# Patient Record
Sex: MALE | Race: WHITE | NOT HISPANIC OR LATINO | Employment: FULL TIME | URBAN - METROPOLITAN AREA
[De-identification: names, ages, dates, MRNs, and addresses within clinical notes are randomized per-mention and may not be internally consistent; named-entity substitution may affect disease eponyms.]

---

## 2017-07-10 ENCOUNTER — OFFICE VISIT (OUTPATIENT)
Dept: FAMILY MEDICINE CLINIC | Facility: CLINIC | Age: 42
End: 2017-07-10

## 2017-07-10 VITALS
HEIGHT: 68 IN | SYSTOLIC BLOOD PRESSURE: 120 MMHG | TEMPERATURE: 98.8 F | DIASTOLIC BLOOD PRESSURE: 70 MMHG | OXYGEN SATURATION: 98 % | WEIGHT: 250 LBS | RESPIRATION RATE: 16 BRPM | HEART RATE: 83 BPM | BODY MASS INDEX: 37.89 KG/M2

## 2017-07-10 DIAGNOSIS — R07.2 SUBSTERNAL CHEST PAIN: Primary | ICD-10-CM

## 2017-07-10 DIAGNOSIS — R07.1 INSPIRATORY PAIN: ICD-10-CM

## 2017-07-10 PROCEDURE — 93000 ELECTROCARDIOGRAM COMPLETE: CPT | Performed by: NURSE PRACTITIONER

## 2017-07-10 PROCEDURE — 71020 XR CHEST 2 VW: CPT | Performed by: NURSE PRACTITIONER

## 2017-07-10 PROCEDURE — 99213 OFFICE O/P EST LOW 20 MIN: CPT | Performed by: NURSE PRACTITIONER

## 2017-07-10 NOTE — PROGRESS NOTES
"Isabela Pisano is a 42 y.o. male.     History of Present Illness   C[place]o sternum pain, [place] when he got in the car, he states it is getting better, but had pain all night and into this morning. He states he thinks \"water went down the wrong way.\" hurts to breath in deep, hurt getting in and out of bed, he states he has had a broken rib before and feels like this, he states the pain is behind the sternum, he states he feels better now but hurts when he takes a deep breath, he [place] wake [place], denies SOA,CP, he denies any heartburn hx, denies HA, change in vision, LE edema. Denies any tenderness to touch. He tried tylenol yesterday but did not help. He denies any injury in particular. He denies cough. He saw Dr. Farias about 2 years ago for bradycardia, wore holter but normal. Normal ekg from 2 years ago, no fam hx of heart problems.     The following portions of the patient's history were reviewed and updated as appropriate: allergies, current medications, past family history, past medical history, past social history, past surgical history and problem list.    Review of Systems   Constitutional: Negative for chills, diaphoresis and fever.   HENT: Negative for congestion, ear pain, postnasal drip, rhinorrhea and sore throat.    Respiratory: Positive for chest tightness. Negative for apnea, cough, shortness of breath and wheezing.    Cardiovascular: Positive for chest pain. Negative for palpitations and leg swelling.   Gastrointestinal: Negative for abdominal pain.   Musculoskeletal: Negative for arthralgias and myalgias.   Skin: Negative for pallor.   Neurological: Negative for dizziness, light-headedness and headaches.   All other systems reviewed and are negative.      Objective   Physical Exam   Constitutional: He is oriented to person, place, and time. He appears well-developed and well-nourished.   HENT:   Head: Normocephalic.   Eyes: Pupils " are equal, round, and reactive to light.   Neck: Neck supple.   Cardiovascular: Normal rate, regular rhythm and normal heart sounds.    Pulmonary/Chest: Effort normal and breath sounds normal. No respiratory distress. He has no decreased breath sounds. He has no wheezes. He has no rhonchi. He exhibits no tenderness and no bony tenderness.   Musculoskeletal: Normal range of motion.   Lymphadenopathy:     He has no cervical adenopathy.   Neurological: He is alert and oriented to person, place, and time.   Skin: Skin is warm and dry.   Psychiatric: He has a normal mood and affect. His behavior is normal. Judgment and thought content normal.   Nursing note and vitals reviewed.  cxr 2v and rib xray in office for pain on inspiration and sternal pain, no comparison noted, shows NAD, awaiting radiology over read.     Assessment/Plan   Jalen was seen today for breathing problem.    Diagnoses and all orders for this visit:    Sternum pain  -     XR Chest 2 View  -     XR Ribs Bilateral 3 View (In Office)    Inspiratory pain  -     XR Chest 2 View  -     XR Ribs Bilateral 3 View (In Office)        cxr 2v in office, rib xray today, will call with results.  EKG in office today, within normal limits.   Stress echo, will call with appt.   If any CP,SOA, or worsening symptoms patient advised to go to the nearest ER.  May try tylenol as needed for pain.   Discussed plan of care with Dr. Ayala.   Increase fluid intake, get plenty of rest.   Patient agrees with plan of care and understands instructions. Call if worsening symptoms or any problems or concerns.

## 2017-07-10 NOTE — PROGRESS NOTES
Procedure     ECG 12 Lead  Date/Time: 7/10/2017 4:26 PM  Performed by: MAUREEN SANCHES  Authorized by: MAUREEN SANCHES   Comparison: compared with previous ECG from 10/2/2013  Comparison to previous ECG: Sinus bradycardia  Rhythm: sinus rhythm  Rate: normal  QRS axis: normal  Clinical impression: normal ECG

## 2017-07-10 NOTE — PATIENT INSTRUCTIONS
cxr 2v in office, rib xray today, will call with results.  EKG in office today, normal.   Stress echo, will call with appt.   If any CP,SOA, or worsening symptoms patient advised to go to the nearest ER.  Discussed plan of care with Dr. Ayala.   Increase fluid intake, get plenty of rest.   Patient agrees with plan of care and understands instructions. Call if worsening symptoms or any problems or concerns.

## 2017-07-12 ENCOUNTER — TELEPHONE (OUTPATIENT)
Dept: FAMILY MEDICINE CLINIC | Facility: CLINIC | Age: 42
End: 2017-07-12

## 2017-07-12 NOTE — TELEPHONE ENCOUNTER
----- Message from RASHAD Archer sent at 7/12/2017  3:00 PM EDT -----  Please call patient with results.  cxr normal.    Pt informed of xray results

## 2017-07-19 ENCOUNTER — TELEPHONE (OUTPATIENT)
Dept: FAMILY MEDICINE CLINIC | Facility: CLINIC | Age: 42
End: 2017-07-19

## 2017-07-31 ENCOUNTER — TELEPHONE (OUTPATIENT)
Dept: FAMILY MEDICINE CLINIC | Facility: CLINIC | Age: 42
End: 2017-07-31

## 2017-07-31 NOTE — TELEPHONE ENCOUNTER
LMOM asked pt why he wanted to cancel Echo test and asked him to call back so the test can be cancelled.

## 2017-11-02 ENCOUNTER — OFFICE VISIT (OUTPATIENT)
Dept: ORTHOPEDIC SURGERY | Facility: CLINIC | Age: 42
End: 2017-11-02

## 2017-11-02 VITALS
WEIGHT: 250 LBS | SYSTOLIC BLOOD PRESSURE: 135 MMHG | DIASTOLIC BLOOD PRESSURE: 86 MMHG | HEART RATE: 62 BPM | HEIGHT: 68 IN | BODY MASS INDEX: 37.89 KG/M2

## 2017-11-02 DIAGNOSIS — M75.41 SUBACROMIAL IMPINGEMENT OF RIGHT SHOULDER: ICD-10-CM

## 2017-11-02 DIAGNOSIS — R52 PAIN: Primary | ICD-10-CM

## 2017-11-02 DIAGNOSIS — G89.29 CHRONIC RIGHT SHOULDER PAIN: ICD-10-CM

## 2017-11-02 DIAGNOSIS — M75.81 TENDINITIS OF RIGHT ROTATOR CUFF: ICD-10-CM

## 2017-11-02 DIAGNOSIS — M75.21 BICEPS TENDINITIS OF RIGHT UPPER EXTREMITY: ICD-10-CM

## 2017-11-02 DIAGNOSIS — M25.511 CHRONIC RIGHT SHOULDER PAIN: ICD-10-CM

## 2017-11-02 PROCEDURE — 99213 OFFICE O/P EST LOW 20 MIN: CPT | Performed by: ORTHOPAEDIC SURGERY

## 2017-11-02 PROCEDURE — 73030 X-RAY EXAM OF SHOULDER: CPT | Performed by: ORTHOPAEDIC SURGERY

## 2017-11-02 NOTE — PROGRESS NOTES
Subjective:     Patient ID: Jalen Pisano is a 42 y.o. male.    Chief Complaint:  Follow-up right shoulder pain  History of Present Illness  Jalen Pisano returns to clinic today for evaluation of right shoulder, icing patient proximally 2 years ago and had some crepitus primarily the subacromial space is causing problems for him when he is doing any throwing activities, it was a fairly tolerable level that point time he treated it conservatively.  However the pain is continued to wax and wane but is been more inflamed over the past 3-4 months, particularly exacerbated when he was driving a car for 40 hours to and from [place], since the time is localized increased levels of pain to the anterolateral aspect of his right shoulder with increased crepitus.  Rates current level pain as a 7-8 out of 10, aching in nature, worse at night, exacerbated with overhead and crossarm and internal rotation activities, minimal improvement with rest and over-the-counter anti-inflammatory medications as well as home program.  Denies any associated numbness or tingling right upper extremity, denies radiation from his shoulder.  Does note a sense of catching the shoulder as well particularly with any attempts at overhead throwing.     Social History     Occupational History   • Not on file.     Social History Main Topics   • Smoking status: Never Smoker   • Smokeless tobacco: Never Used   • Alcohol use Yes      Comment: occas   • Drug use: No   • Sexual activity: Not on file      History reviewed. No pertinent past medical history.  Past Surgical History:   Procedure Laterality Date   • KNEE MENISCECTOMY Left 1996       Family History   Problem Relation Age of Onset   • Diabetes Father    • Hypertension Father          Review of Systems   Constitutional: Negative for chills, diaphoresis, fever and unexpected weight change.   HENT: Negative for hearing loss, nosebleeds, sore throat and tinnitus.    Eyes: Negative for pain and visual  "disturbance.   Respiratory: Negative for cough, shortness of breath and wheezing.    Cardiovascular: Negative for chest pain and palpitations.   Gastrointestinal: Negative for abdominal pain, diarrhea, nausea and vomiting.   Endocrine: Negative for cold intolerance, heat intolerance and polydipsia.   Genitourinary: Negative for difficulty urinating, dysuria and hematuria.   Musculoskeletal: Positive for arthralgias. Negative for joint swelling and myalgias.   Skin: Negative for rash and wound.   Allergic/Immunologic: Negative for environmental allergies.   Neurological: Negative for dizziness, syncope and numbness.   Hematological: Does not bruise/bleed easily.   Psychiatric/Behavioral: Negative for dysphoric mood and sleep disturbance. The patient is not nervous/anxious.            Objective:  Vitals:    11/02/17 1037   BP: 135/86   Pulse: 62   Weight: 250 lb (113 kg)   Height: 68\" (172.7 cm)     Last 2 weights    11/02/17  1037   Weight: 250 lb (113 kg)     Body mass index is 38.01 kg/(m^2).  General: No acute distress.  Resp: normal respiratory effort  Skin: no rashes or wounds; normal turgor  Psych: mood and affect appropriate; recent and remote memory intact         Ortho Exam    Right shoulder-active forward flexion 165°, active external rotation 45°, active internal rotation L1, 4 out of 5 strength on resisted forward flexion and external rotation, 4+ out of 5 strength on belly press test with negative bear hug sign.  Minimally positive Yergason, speed's, [place]'s.  Mild tenderness over acromioclavicular joint with mildly positive crossarm test.  Positive Villa and Neer's, positive empty can test, negative drop arm test, negative external rotation lag sign.  Brisk cap refill all digits right upper extremity, 2+ radial pulse right wrist.    Imaging:  Right Shoulder X-Ray  Indication: Pain  AP, scapular Y, and axillary lateral views    Findings:  No fracture  No bony lesion  Normal soft tissues  Normal joint " spaces, small region of osteophyte formation along inferior glenoid with no significant glenohumeral joint space narrowing.    No prior studies were available for comparison.    Assessment:       1. Pain    2. Chronic right shoulder pain    3. Tendinitis of right rotator cuff    4. Biceps tendinitis of right upper extremity    5. Subacromial impingement of right shoulder          Plan:  BRIAN query complete.          Discussed treatment options at length with patient at today's visit. Given persistence of symptoms over last several years with acute exacerbation as well as mechanical nature of symptoms and findings on physical exam today as well as failure conservative treatment, patient would like to proceed with MR arthrogram right shoulder to evaluate for labral pathology as well as rotator cuff tear.  I will contact patient with results from MRI and discuss further treatment options at that time.    Jalen Pisano was in agreement with plan and had all questions answered.     Orders:  Orders Placed This Encounter   Procedures   • XR Shoulder 2+ View Right   • FL Contrast Injection CT / MRI   • MRI shoulder right arthrogram       Medications:  No orders of the defined types were placed in this encounter.      Followup:  No Follow-up on file.    Jalen was seen today for pain.    Diagnoses and all orders for this visit:    Pain  -     XR Shoulder 2+ View Right    Chronic right shoulder pain  -     FL Contrast Injection CT / MRI; Future  -     MRI shoulder right arthrogram; Future    Tendinitis of right rotator cuff    Biceps tendinitis of right upper extremity    Subacromial impingement of right shoulder        Dragon transcription disclaimer     Much of this encounter note is an electronic transcription/translation of spoken language to printed text. The electronic translation of spoken language may permit erroneous, or at times, nonsensical words or phrases to be inadvertently transcribed. Although I have reviewed  the note for such errors, some may still exist.

## 2017-11-09 ENCOUNTER — TELEPHONE (OUTPATIENT)
Dept: ORTHOPEDIC SURGERY | Facility: CLINIC | Age: 42
End: 2017-11-09

## 2017-11-09 DIAGNOSIS — M25.511 CHRONIC RIGHT SHOULDER PAIN: ICD-10-CM

## 2017-11-09 DIAGNOSIS — G89.29 CHRONIC RIGHT SHOULDER PAIN: ICD-10-CM

## 2017-11-14 ENCOUNTER — OFFICE VISIT (OUTPATIENT)
Dept: ORTHOPEDIC SURGERY | Facility: CLINIC | Age: 42
End: 2017-11-14

## 2017-11-14 DIAGNOSIS — M19.019 AC JOINT ARTHROPATHY: ICD-10-CM

## 2017-11-14 DIAGNOSIS — M75.41 SUBACROMIAL IMPINGEMENT OF RIGHT SHOULDER: ICD-10-CM

## 2017-11-14 DIAGNOSIS — R52 PAIN: Primary | ICD-10-CM

## 2017-11-14 DIAGNOSIS — M75.81 TENDINITIS OF RIGHT ROTATOR CUFF: ICD-10-CM

## 2017-11-14 PROCEDURE — 99214 OFFICE O/P EST MOD 30 MIN: CPT | Performed by: ORTHOPAEDIC SURGERY

## 2017-11-14 PROCEDURE — 20605 DRAIN/INJ JOINT/BURSA W/O US: CPT | Performed by: ORTHOPAEDIC SURGERY

## 2017-11-14 RX ORDER — BUPIVACAINE HYDROCHLORIDE 5 MG/ML
1 INJECTION, SOLUTION EPIDURAL; INTRACAUDAL
Status: COMPLETED | OUTPATIENT
Start: 2017-11-14 | End: 2017-11-14

## 2017-11-14 RX ORDER — TRIAMCINOLONE ACETONIDE 40 MG/ML
40 INJECTION, SUSPENSION INTRA-ARTICULAR; INTRAMUSCULAR
Status: COMPLETED | OUTPATIENT
Start: 2017-11-14 | End: 2017-11-14

## 2017-11-14 RX ORDER — LIDOCAINE HYDROCHLORIDE 10 MG/ML
1 INJECTION, SOLUTION EPIDURAL; INFILTRATION; INTRACAUDAL; PERINEURAL
Status: COMPLETED | OUTPATIENT
Start: 2017-11-14 | End: 2017-11-14

## 2017-11-14 RX ADMIN — BUPIVACAINE HYDROCHLORIDE 1 ML: 5 INJECTION, SOLUTION EPIDURAL; INTRACAUDAL at 13:23

## 2017-11-14 RX ADMIN — LIDOCAINE HYDROCHLORIDE 1 ML: 10 INJECTION, SOLUTION EPIDURAL; INFILTRATION; INTRACAUDAL; PERINEURAL at 13:23

## 2017-11-14 RX ADMIN — TRIAMCINOLONE ACETONIDE 40 MG: 40 INJECTION, SUSPENSION INTRA-ARTICULAR; INTRAMUSCULAR at 13:23

## 2017-11-14 NOTE — PROGRESS NOTES
Subjective:     Patient ID: Jalen Pisano is a 42 y.o. male.    Chief Complaint:  Follow-up right shoulder pain  History of Present Illness  Jalen Pisano returns to clinic today for evaluation of right shoulder, here today for follow-up on MRI, has noted continued pain of the superior aspect with some extension over the anterolateral aspect of his right shoulder, exacerbated primarily at this point time with crossarm and internal rotation activities, continued minimal improvement with rest and anti-inflammatory medications, rates current level of pain as a 7 out of 10 and describes it as aching in nature.  Denies any numbness or tingling right upper extremity, denies any significant radiation of pain down into his arm, does note occasional pain up into the paraspinal region of his right neck.   Social History     Occupational History   • Not on file.     Social History Main Topics   • Smoking status: Never Smoker   • Smokeless tobacco: Never Used   • Alcohol use Yes      Comment: occas   • Drug use: No   • Sexual activity: Not on file      No past medical history on file.  Past Surgical History:   Procedure Laterality Date   • KNEE MENISCECTOMY Left 1996       Family History   Problem Relation Age of Onset   • Diabetes Father    • Hypertension Father          Review of Systems   Constitutional: Negative for chills, diaphoresis, fever and unexpected weight change.   HENT: Negative for hearing loss, nosebleeds, sore throat and tinnitus.    Eyes: Negative for pain and visual disturbance.   Respiratory: Negative for cough, shortness of breath and wheezing.    Cardiovascular: Negative for chest pain and palpitations.   Gastrointestinal: Negative for abdominal pain, diarrhea, nausea and vomiting.   Endocrine: Negative for cold intolerance, heat intolerance and polydipsia.   Genitourinary: Negative for difficulty urinating, dysuria and hematuria.   Musculoskeletal: Positive for arthralgias. Negative for joint swelling and  myalgias.   Skin: Negative for rash and wound.   Allergic/Immunologic: Negative for environmental allergies.   Neurological: Negative for dizziness, syncope and numbness.   Hematological: Does not bruise/bleed easily.   Psychiatric/Behavioral: Negative for dysphoric mood and sleep disturbance. The patient is not nervous/anxious.    All other systems reviewed and are negative.          Objective:  There were no vitals filed for this visit.  There were no vitals filed for this visit.  There is no height or weight on file to calculate BMI.  General: No acute distress.  Resp: normal respiratory effort  Skin: no rashes or wounds; normal turgor  Psych: mood and affect appropriate; recent and remote memory intact         Ortho Exam    Right shoulder-active forward flexion 170°, external rotation 50°, internal rotation L1, 4+ out of 5 strength in all planes of motion, maximal tenderness palpation over acromioclavicular joint with positive crossarm test, mildly positive Villa and Neer's, negative drop arm test, mildly positive empty can test, negative external rotation lag sign.  Negative Yergason, speed's, [place]'s.    Imaging:  Review of outside MRI arthrogram right shoulder as well as radiology report from [facility] and open MRI indicates significant degenerative changes acromial clavicular joint with marrow edema noted in the distal clavicle and proximal acromion adjacent to this area, mild rotator cuff tendinopathy appreciated with no evidence of full-thickness rotator cuff tear.  No discrete evidence of SLAP lesion.    Assessment:       1. Pain    2. AC joint arthropathy    3. Tendinitis of right rotator cuff    4. Subacromial impingement of right shoulder          Plan:  BRIAN query complete.  Medium Joint Arthrocentesis  Date/Time: 11/14/2017 1:23 PM  Procedure Details  Location: shoulder - R acromioclavicular  Needle size: 22 G  Medications administered: 40 mg triamcinolone acetonide 40 MG/ML; 1 mL lidocaine PF  1% 1 %; 1 mL bupivacaine (PF) 0.5 %                Discussed treatment options at length with patient at today's visit.  Given localization of pain primarily over the acromial clavicular joint we discussed options and patient did wish to proceed today with local injection to the acromial clavicular joint.  Recommended ice, soft tissue massage, and avoidance of crossarm activities for 5-7 days to assess for improvement.  He states he does still also has some stiffness, may consider a glenohumeral injection at follow-up visit if incomplete relief with acromioclavicular joint injection.    Jalen Pisano was in agreement with plan and had all questions answered.     Orders:  Orders Placed This Encounter   Procedures   • Medium Joint Arthrocentesis       Medications:  No orders of the defined types were placed in this encounter.      Followup:  Return in about 4 weeks (around 12/12/2017).    Jalen was seen today for follow-up and pain.    Diagnoses and all orders for this visit:    Pain  -     Medium Joint Arthrocentesis    AC joint arthropathy  -     Medium Joint Arthrocentesis    Tendinitis of right rotator cuff  -     Medium Joint Arthrocentesis    Subacromial impingement of right shoulder  -     Medium Joint Arthrocentesis    Other orders  -     Cancel: Large Joint Arthrocentesis        Dragon transcription disclaimer     Much of this encounter note is an electronic transcription/translation of spoken language to printed text. The electronic translation of spoken language may permit erroneous, or at times, nonsensical words or phrases to be inadvertently transcribed. Although I have reviewed the note for such errors, some may still exist.

## 2017-12-14 ENCOUNTER — OFFICE VISIT (OUTPATIENT)
Dept: ORTHOPEDIC SURGERY | Facility: CLINIC | Age: 42
End: 2017-12-14

## 2017-12-14 DIAGNOSIS — M19.019 AC JOINT ARTHROPATHY: ICD-10-CM

## 2017-12-14 DIAGNOSIS — M75.81 TENDINITIS OF RIGHT ROTATOR CUFF: Primary | ICD-10-CM

## 2017-12-14 DIAGNOSIS — M75.41 SUBACROMIAL IMPINGEMENT OF RIGHT SHOULDER: ICD-10-CM

## 2017-12-14 PROCEDURE — 99212 OFFICE O/P EST SF 10 MIN: CPT | Performed by: ORTHOPAEDIC SURGERY

## 2018-02-06 ENCOUNTER — OFFICE VISIT (OUTPATIENT)
Dept: ORTHOPEDIC SURGERY | Facility: CLINIC | Age: 43
End: 2018-02-06

## 2018-02-06 DIAGNOSIS — M19.019 AC JOINT ARTHROPATHY: Primary | ICD-10-CM

## 2018-02-06 DIAGNOSIS — M75.41 SUBACROMIAL IMPINGEMENT OF RIGHT SHOULDER: ICD-10-CM

## 2018-02-06 PROCEDURE — 99214 OFFICE O/P EST MOD 30 MIN: CPT | Performed by: ORTHOPAEDIC SURGERY

## 2018-02-06 RX ORDER — OXYCODONE HCL 10 MG/1
10 TABLET, FILM COATED, EXTENDED RELEASE ORAL ONCE
Status: CANCELLED | OUTPATIENT
Start: 2018-02-06 | End: 2018-02-06

## 2018-02-06 RX ORDER — ACETAMINOPHEN 325 MG/1
1000 TABLET ORAL ONCE
Status: CANCELLED | OUTPATIENT
Start: 2018-02-06 | End: 2018-02-06

## 2018-02-06 RX ORDER — PANTOPRAZOLE SODIUM 20 MG/1
40 TABLET, DELAYED RELEASE ORAL ONCE
Status: CANCELLED | OUTPATIENT
Start: 2018-02-06 | End: 2018-02-06

## 2018-02-06 RX ORDER — MELOXICAM 7.5 MG/1
15 TABLET ORAL ONCE
Status: CANCELLED | OUTPATIENT
Start: 2018-02-06 | End: 2018-02-06

## 2018-02-06 RX ORDER — PREGABALIN 25 MG/1
75 CAPSULE ORAL ONCE
Status: CANCELLED | OUTPATIENT
Start: 2018-02-06 | End: 2018-02-06

## 2018-02-06 NOTE — PROGRESS NOTES
Subjective:     Patient ID: Jalen Pisano is a 42 y.o. male.    Chief Complaint:  Follow-up right shoulder pain, acromioclavicular joint arthropathy, subacromial impingement and rotator cuff tendinopathy  History of Present Illness  Jalen Pisano returns to clinic today for evaluation of right shoulder, over the last 4 weeks he's been having increasing levels of pain that is been exacerbated ×2 weeks to a level of 7-8 out of 10, aching in nature, exacerbated primarily with crossarm activities, minimal improvement with rest and anti--4 medications.  He did have good short-term relief with prior acromial clavicular joint injection.  Localizes pain to the superior aspect the shoulder, denies radiation, denies associated numbness or tingling.     Social History     Occupational History   • Not on file.     Social History Main Topics   • Smoking status: Never Smoker   • Smokeless tobacco: Never Used   • Alcohol use Yes      Comment: occas   • Drug use: No   • Sexual activity: Not on file      No past medical history on file.  Past Surgical History:   Procedure Laterality Date   • KNEE MENISCECTOMY Left 1996       Family History   Problem Relation Age of Onset   • Diabetes Father    • Hypertension Father          Review of Systems   Constitutional: Negative for chills, diaphoresis, fever and unexpected weight change.   HENT: Negative for hearing loss, nosebleeds, sore throat and tinnitus.    Eyes: Negative for pain and visual disturbance.   Respiratory: Negative for cough, shortness of breath and wheezing.    Cardiovascular: Negative for chest pain and palpitations.   Gastrointestinal: Negative for abdominal pain, diarrhea, nausea and vomiting.   Endocrine: Negative for cold intolerance, heat intolerance and polydipsia.   Genitourinary: Negative for difficulty urinating, dysuria and hematuria.   Musculoskeletal: Positive for arthralgias.   Skin: Negative for rash and wound.   Allergic/Immunologic: Negative for environmental  allergies.   Neurological: Negative for dizziness, syncope and numbness.   Hematological: Does not bruise/bleed easily.   Psychiatric/Behavioral: Negative for dysphoric mood and sleep disturbance. The patient is not nervous/anxious.    All other systems reviewed and are negative.          Objective:  There were no vitals filed for this visit.  There were no vitals filed for this visit.  There is no height or weight on file to calculate BMI.  General: No acute distress.  Resp: normal respiratory effort  Skin: no rashes or wounds; normal turgor  Psych: mood and affect appropriate; recent and remote memory intact         Ortho Exam    Right shoulder-maximal tenderness palpation over acromial clavicular joint with positive crossarm test, active forward flexion 175°, external rotation 55°, internal rotation T12, 4+ out of 5 strength in all planes of motion with minimally positive empty can test, mildly positive Villa and Neer's, negative drop arm test, negative external rotation lag sign, negative speed, negative Yergason, negative [place]'s.  Positive sensation light touch all discretions right hand symmetric to the left, brisk cap refill all digits, 2+ radial pulse right wrist.    Imaging:  Review again of prior MRI right shoulder as well as radiology report indicates mild rotator cuff tendinopathy, moderate acromial clavicular joint arthropathy with bony edema in the proximal acromion and distal clavicle surrounding the region.  No evidence of full-thickness rotator cuff tear.    Assessment:       1. AC joint arthropathy    2. Subacromial impingement of right shoulder          Plan:  BRIAN query complete.          Discussed treatment options at length with patient at today's visit. At this point in time given failure of injections, anti-inflammatory medications, home exercise program, and activity modification, patient will like to proceed with surgery. Plan will be for right shoulder arthroscopy, possible rotator  cuff debridement versus repair, possible biceps tenodesis, subacromial decompression, open distal clavicle excision and all associated procedures.  I reviewed risks benefits and alternatives the procedure with risks including not limited to neurovascular damage, bleeding, infection, chronic pain, re-tear rotator cuff, failure of healing rotator cuff, loss of motion, weakness, stiffness, instability, biceps sag, DVT, pulmonary embolus, death, stroke, complex regional pain syndrome, myocardial infarction, need for additional procedures. He understood all these had all questions answered.  Patient verbally consented to proceed with surgery.  No guarantees were given regarding results of surgery.  We will have patient medically optimized by primary care physician and proceed with surgery at next available date.    Patient denies any significant cardiac history.  Denies history of DVT or pulmonary embolus.    Jalen Pisano was in agreement with plan and had all questions answered.     Orders:  Orders Placed This Encounter   Procedures   • Basic metabolic panel   • Protime-INR   • APTT   • Follow anesthesia standing orders.   • Provide instructions to patient regarding NPO status   • Clorhexidine skin prep   • CBC and Differential       Medications:  No orders of the defined types were placed in this encounter.      Followup:  No Follow-up on file.    Jalen was seen today for follow-up and pain.    Diagnoses and all orders for this visit:    AC joint arthropathy  -     Case Request; Standing  -     CBC and Differential; Future  -     Basic metabolic panel; Future  -     Protime-INR; Future  -     APTT; Future  -     pregabalin (LYRICA) capsule 75 mg; Take 3 capsules by mouth 1 (One) Time.  -     oxyCODONE (oxyCONTIN) 12 hr tablet 10 mg; Take 1 tablet by mouth 1 (One) Time.  -     acetaminophen (TYLENOL) tablet 975 mg; Take 3 tablets by mouth 1 (One) Time.  -     meloxicam (MOBIC) tablet 15 mg; Take 2 tablets by mouth 1 (One)  Time.  -     pantoprazole (PROTONIX) EC tablet 40 mg; Take 2 tablets by mouth 1 (One) Time.  -     Case Request    Subacromial impingement of right shoulder    Other orders  -     Follow anesthesia standing orders.  -     Provide instructions to patient regarding NPO status  -     Clorhexidine skin prep  -     Follow Anesthesia Guidelines / Standing Orders; Standing  -     Verify NPO Status; Standing  -     Clip operative site; Standing  -     Obtain informed consent (if not collected inpatient or PAT); Standing        Dictated utilizing Dragon dictation

## 2018-02-07 ENCOUNTER — PREP FOR SURGERY (OUTPATIENT)
Dept: OTHER | Facility: HOSPITAL | Age: 43
End: 2018-02-07

## 2018-02-07 DIAGNOSIS — M19.019 AC JOINT ARTHROPATHY: Primary | ICD-10-CM

## 2018-02-15 ENCOUNTER — OFFICE VISIT (OUTPATIENT)
Dept: FAMILY MEDICINE CLINIC | Facility: CLINIC | Age: 43
End: 2018-02-15

## 2018-02-15 VITALS
TEMPERATURE: 97.9 F | WEIGHT: 253 LBS | OXYGEN SATURATION: 92 % | HEIGHT: 68 IN | SYSTOLIC BLOOD PRESSURE: 138 MMHG | HEART RATE: 73 BPM | DIASTOLIC BLOOD PRESSURE: 88 MMHG | BODY MASS INDEX: 38.34 KG/M2

## 2018-02-15 DIAGNOSIS — Z01.818 PREOPERATIVE CLEARANCE: Primary | ICD-10-CM

## 2018-02-15 PROCEDURE — 99396 PREV VISIT EST AGE 40-64: CPT | Performed by: FAMILY MEDICINE

## 2018-02-15 NOTE — PROGRESS NOTES
SUBJECTIVE:  The patient is a 42-year-old white male who comes in for preoperative clearance.  He is going have right shoulder surgery on February 26.  He is relatively healthy. He Takes no regular medications.  He has no cardiac history.  He feels fine today other than his shoulder problems.     PAST MEDICAL HISTORY:  Reviewed.    REVIEW OF SYSTEMS:  Please see above; 14 point ROS otherwise negative.      OBJECTIVE: Vitals signs are reviewed and are stable.    HEENT: PERRLA.    Neck:  Supple.    Lungs:  Clear.  Equal bilateral expansion  Heart:  Regular rate and rhythm.  Murmur gallop or rub  Abdomen:   Soft, nontender.    Extremities:  No cyanosis, clubbing or edema.      ASSESSMENT:     Preoperative clearance.  Obesity    PLAN:  Once his preoperative labs are reviewed and I will approve him for surgery.  He will for the this information to me.    Much of this encounter note is an electronic transcription/translation of spoken language to printed text.  The electronic translation of spoken language may permit erroneous, or at times, nonsensical words or phrases to be inadvertently transcribed.  Although I have reviewed the note for such errors, some may still exist.

## 2018-02-16 ENCOUNTER — APPOINTMENT (OUTPATIENT)
Dept: PREADMISSION TESTING | Facility: HOSPITAL | Age: 43
End: 2018-02-16

## 2018-02-16 VITALS
HEIGHT: 68 IN | OXYGEN SATURATION: 96 % | BODY MASS INDEX: 38.43 KG/M2 | DIASTOLIC BLOOD PRESSURE: 80 MMHG | RESPIRATION RATE: 16 BRPM | SYSTOLIC BLOOD PRESSURE: 142 MMHG | WEIGHT: 253.6 LBS | HEART RATE: 56 BPM

## 2018-02-16 DIAGNOSIS — M19.019 AC JOINT ARTHROPATHY: ICD-10-CM

## 2018-02-16 LAB
ANION GAP SERPL CALCULATED.3IONS-SCNC: 12.9 MMOL/L
APTT PPP: 28.6 SECONDS (ref 24.3–38.1)
BASOPHILS # BLD AUTO: 0.04 10*3/MM3 (ref 0–0.2)
BASOPHILS NFR BLD AUTO: 0.7 % (ref 0–2)
BUN BLD-MCNC: 9 MG/DL (ref 6–20)
BUN/CREAT SERPL: 8.8 (ref 7–25)
CALCIUM SPEC-SCNC: 8.7 MG/DL (ref 8.6–10.5)
CHLORIDE SERPL-SCNC: 103 MMOL/L (ref 98–107)
CO2 SERPL-SCNC: 25.1 MMOL/L (ref 22–29)
CREAT BLD-MCNC: 1.02 MG/DL (ref 0.76–1.27)
DEPRECATED RDW RBC AUTO: 41.8 FL (ref 37–54)
EOSINOPHIL # BLD AUTO: 0.07 10*3/MM3 (ref 0.1–0.3)
EOSINOPHIL NFR BLD AUTO: 1.1 % (ref 0–4)
ERYTHROCYTE [DISTWIDTH] IN BLOOD BY AUTOMATED COUNT: 13 % (ref 11.5–14.5)
GFR SERPL CREATININE-BSD FRML MDRD: 80 ML/MIN/1.73
GLUCOSE BLD-MCNC: 125 MG/DL (ref 65–99)
HCT VFR BLD AUTO: 43.5 % (ref 42–52)
HGB BLD-MCNC: 15 G/DL (ref 14–18)
IMM GRANULOCYTES # BLD: 0.04 10*3/MM3 (ref 0–0.03)
IMM GRANULOCYTES NFR BLD: 0.7 % (ref 0–0.5)
INR PPP: 0.99 (ref 0.9–1.1)
LYMPHOCYTES # BLD AUTO: 2.41 10*3/MM3 (ref 0.6–4.8)
LYMPHOCYTES NFR BLD AUTO: 39.6 % (ref 20–45)
MCH RBC QN AUTO: 30.3 PG (ref 27–31)
MCHC RBC AUTO-ENTMCNC: 34.5 G/DL (ref 31–37)
MCV RBC AUTO: 87.9 FL (ref 80–94)
MONOCYTES # BLD AUTO: 0.5 10*3/MM3 (ref 0–1)
MONOCYTES NFR BLD AUTO: 8.2 % (ref 3–8)
NEUTROPHILS # BLD AUTO: 3.03 10*3/MM3 (ref 1.5–8.3)
NEUTROPHILS NFR BLD AUTO: 49.7 % (ref 45–70)
NRBC BLD MANUAL-RTO: 0 /100 WBC (ref 0–0)
PLATELET # BLD AUTO: 250 10*3/MM3 (ref 140–500)
PMV BLD AUTO: 9.8 FL (ref 7.4–10.4)
POTASSIUM BLD-SCNC: 4.3 MMOL/L (ref 3.5–5.2)
PROTHROMBIN TIME: 13.1 SECONDS (ref 12.1–15)
RBC # BLD AUTO: 4.95 10*6/MM3 (ref 4.7–6.1)
SODIUM BLD-SCNC: 141 MMOL/L (ref 136–145)
WBC NRBC COR # BLD: 6.09 10*3/MM3 (ref 4.8–10.8)

## 2018-02-16 PROCEDURE — 80048 BASIC METABOLIC PNL TOTAL CA: CPT | Performed by: ORTHOPAEDIC SURGERY

## 2018-02-16 PROCEDURE — 93005 ELECTROCARDIOGRAM TRACING: CPT

## 2018-02-16 PROCEDURE — 85025 COMPLETE CBC W/AUTO DIFF WBC: CPT | Performed by: ORTHOPAEDIC SURGERY

## 2018-02-16 PROCEDURE — 85730 THROMBOPLASTIN TIME PARTIAL: CPT | Performed by: ORTHOPAEDIC SURGERY

## 2018-02-16 PROCEDURE — 93010 ELECTROCARDIOGRAM REPORT: CPT | Performed by: INTERNAL MEDICINE

## 2018-02-16 PROCEDURE — 85610 PROTHROMBIN TIME: CPT | Performed by: ORTHOPAEDIC SURGERY

## 2018-02-16 RX ORDER — IBUPROFEN 400 MG/1
400 TABLET ORAL EVERY 6 HOURS PRN
COMMUNITY
End: 2019-06-27

## 2018-02-16 NOTE — PAT
Pt here for PAT visit.  Pre-op tests completed, chg soap given, and instructions reviewed.  Instructed clears until 2 hrs prior to arrival time, voiced understanding.  Getting medical clearance from PCP.  Will need accu-check dos.

## 2018-02-16 NOTE — DISCHARGE INSTRUCTIONS
PRE-ADMISSION TESTING INSTRUCTIONS FOR ADULTS    Take these medications the morning of surgery with a small sip of water:      No aspirin, advil, aleve, ibuprofen, naproxen, diet pills, decongestants, or herbal/vitamins for a week prior to surgery.    General Instructions:    • Do not eat solid food after midnight the night before surgery.  No gum, mints, or hard candy after midnight the night before surgery.  • You may drink clear liquids the day of surgery up until 2 hours before your arrival time.  • Clear liquids are liquids you can see through. Nothing RED in color.    Plain water    Sports drinks  Sodas     Gelatin (Jell-O)  Fruit juices without pulp such as white grape juice and apple juice  Popsicles that contain no fruit or yogurt  Tea or coffee (no cream or milk added)    • It is beneficial for you to have a clear drink that contains carbohydrates just before you leave your house and before your fasting time begins.  We suggest a 20 ounce bottle of Gatorade or Powerade for non-diabetic patients or a 20 ounce bottle of G2 or Powerade Zero for diabetic patients.     • Patients who avoid smoking, chewing tobacco and alcohol for 4 weeks prior to surgery have a reduced risk of post-operative complications.  If at all possible, quit smoking as many days before surgery as you can.    • Do not smoke, use chewing tobacco or drink alcohol the day of surgery    • Bring your C-PAP/ BI-PAP machine if you use one.  • Wear clean comfortable clothes and socks.  • Do not wear contact lenses, lotion, deodorant, or make-up.  Bring a case for your glasses if applicable. You may brush your teeth the morning of surgery.  • You may wear dentures/partials, do not put adhesive/glue on them.  • Bring crutches or walker if applicable.  • Leave all other jewelry and valuables at home.      Preventing a Surgical Site Infection:    • Shower the night before and on the morning of surgery using the chlorhexidine soap you were given.  Use  a clean washcloth with the soap.  Place clean sheets on your bed after showering the night before surgery. Do not use the CHG soap on your hair, face, or private areas. Wash your body gently for five (5) minutes. Do not scrub your skin.  Dry with a clean towel and dress in clean clothing.    • Do not shave the surgical area for 10 days-2 weeks prior to surgery  because the razor can irritate skin and make it easier to develop an infection.  • Make sure you, your family, and all healthcare providers clean their hands with soap and water or an alcohol based hand  before caring for you or your wound.      Day of surgery:    Your surgeon’s office will advise you of your arrival time for the day of surgery.    Upon arrival, a Pre-op nurse and Anesthesia provider will review your health history, obtain vital signs, and answer questions you may have.  The only belongings needed at this time will be your home medications and if applicable your C-PAP/BI-PAP machine.  If you are staying overnight your family can leave the rest of your belongings in the car and bring them to your room later.  A Pre-op nurse will start an IV and you may receive medication in preparation for surgery, including something to help you relax.  Your family will be able to see you in the Pre-op area.  While you are in surgery your family should notify the waiting room  if they leave the waiting room area and provide a contact phone number.    IF you have any questions, you can call the Pre-Admission Department at [phone] or your surgeon's office.  Notify your surgeon if  you become sick, have a fever, productive cough, or cannot be here the day of surgery    Please be aware that surgery does come with discomfort.  We want to make every effort to control your discomfort so please discuss any uncontrolled symptoms with your nurse.   Your doctor will most likely have prescribed pain medications.      If you are going home  after surgery, you will receive individualized written care instructions before being discharged.  A responsible adult (over the age of 18) must drive you to and from the hospital on the day of your surgery and stay with you for 24 hours after anesthesia.    If you are staying overnight following surgery, you will be transported to your hospital room following the recovery period.  [facility] has all private rooms.    Deductibles and co-payments are collected on the day of service. Please be prepared to pay the required co-pay, deductible or deposit on the day of service as defined by your plan.      Shoulder Arthroscopy  Shoulder arthroscopy is a surgical technique used to evaluate and treat injuries involving the shoulder joint. In this technique, small cuts (incisions) are made in your shoulder. A small, telescope-like instrument with a lighted camera on one end (arthroscope) and surgical instruments are inserted through these incisions into your shoulder joint. This allows your health care provider to look directly into the joint and repair any damage at the same time.   LET YOUR HEALTH CARE PROVIDER KNOW ABOUT:  · Any allergies you have.  · All medicines you are taking, including vitamins, herbs, eye drops, creams, and over-the-counter medicines.  · Previous problems you or members of your family have had with the use of anesthetics.  · Any blood disorders you have.  · Previous surgeries you have had.  · Medical conditions you have.  RISKS AND COMPLICATIONS  Generally, this is a safe procedure. However, problems can occur and include:  · Damage to nerves or blood vessels.  · Excess bleeding.  · Blood clots.  · Infection.  BEFORE THE PROCEDURE   · Ask your health care provider about:    Changing or stopping your regular medicines. This is especially important if you are taking diabetes medicines or blood thinners.    Taking medicines such as aspirin and ibuprofen. These medicines can thin your  blood. Do not take these medicines before your procedure if your health care provider asks you not to.  · Do not eat or drink anything after midnight on the night before the procedure or as directed by your health care provider.  · You may have an exam or testing.  PROCEDURE   · You may be given a medicine to make you sleep (general anesthetic) and a medicine to numb the shoulder area (local anesthetic or nerve block).  · Several small incisions will be made in your shoulder. Saline fluid will be put in through one of the incisions to expand the joint space so your health care provider can see the area more easily.  · An arthroscope will be inserted into one of the incisions. The arthroscope sends an image to a TV screen so the health care provider can examine your shoulder joint.  · During the procedure, your health care provider may find various problems.  · Tools may be inserted through the other incisions to repair any injuries found. In some cases, the procedure may change to an open surgery if problems are found that cannot be repaired with arthroscopy.  · The incisions will then be closed with stitches and a dressing applied.  AFTER THE PROCEDURE   You will be taken to a recovery area where your progress will be watched.      This information is not intended to replace advice given to you by your health care provider. Make sure you discuss any questions you have with your health care provider.     Document Released: 07/15/2015 Document Reviewed: 07/15/2015      [employer]® Patient Information ©2016 [employer].

## 2018-02-23 ENCOUNTER — ANESTHESIA EVENT (OUTPATIENT)
Dept: PERIOP | Facility: HOSPITAL | Age: 43
End: 2018-02-23

## 2018-02-26 ENCOUNTER — HOSPITAL ENCOUNTER (OUTPATIENT)
Facility: HOSPITAL | Age: 43
Setting detail: HOSPITAL OUTPATIENT SURGERY
Discharge: HOME | End: 2018-02-26
Attending: ORTHOPAEDIC SURGERY | Admitting: ORTHOPAEDIC SURGERY

## 2018-02-26 ENCOUNTER — ANESTHESIA (OUTPATIENT)
Dept: PERIOP | Facility: HOSPITAL | Age: 43
End: 2018-02-26

## 2018-02-26 VITALS
TEMPERATURE: 97.2 F | DIASTOLIC BLOOD PRESSURE: 74 MMHG | HEART RATE: 67 BPM | WEIGHT: 254.6 LBS | SYSTOLIC BLOOD PRESSURE: 116 MMHG | OXYGEN SATURATION: 96 % | RESPIRATION RATE: 16 BRPM | BODY MASS INDEX: 38.71 KG/M2

## 2018-02-26 DIAGNOSIS — M19.019 AC JOINT ARTHROPATHY: ICD-10-CM

## 2018-02-26 PROCEDURE — 25010000002 KETOROLAC TROMETHAMINE PER 15 MG: Performed by: NURSE ANESTHETIST, CERTIFIED REGISTERED

## 2018-02-26 PROCEDURE — 25010000002 MIDAZOLAM PER 1 MG: Performed by: NURSE ANESTHETIST, CERTIFIED REGISTERED

## 2018-02-26 PROCEDURE — 29823 SHO ARTHRS SRG XTNSV DBRDMT: CPT | Performed by: ORTHOPAEDIC SURGERY

## 2018-02-26 PROCEDURE — 25010000002 EPINEPHRINE PER 0.1 MG: Performed by: ORTHOPAEDIC SURGERY

## 2018-02-26 PROCEDURE — 25010000002 PROPOFOL 10 MG/ML EMULSION: Performed by: NURSE ANESTHETIST, CERTIFIED REGISTERED

## 2018-02-26 PROCEDURE — 25010000002 DEXAMETHASONE PER 1 MG: Performed by: NURSE ANESTHETIST, CERTIFIED REGISTERED

## 2018-02-26 PROCEDURE — 23120 CLAVICULECTOMY PARTIAL: CPT | Performed by: ORTHOPAEDIC SURGERY

## 2018-02-26 PROCEDURE — 25010000002 NEOSTIGMINE PER 0.5 MG: Performed by: NURSE ANESTHETIST, CERTIFIED REGISTERED

## 2018-02-26 PROCEDURE — 25010000002 FENTANYL CITRATE (PF) 100 MCG/2ML SOLUTION: Performed by: NURSE ANESTHETIST, CERTIFIED REGISTERED

## 2018-02-26 PROCEDURE — 25010000002 SUCCINYLCHOLINE PER 20 MG: Performed by: NURSE ANESTHETIST, CERTIFIED REGISTERED

## 2018-02-26 PROCEDURE — 25010000002 ONDANSETRON PER 1 MG: Performed by: NURSE ANESTHETIST, CERTIFIED REGISTERED

## 2018-02-26 PROCEDURE — 25010000003 CEFAZOLIN PER 500 MG: Performed by: ORTHOPAEDIC SURGERY

## 2018-02-26 RX ORDER — ACETAMINOPHEN 500 MG
1000 TABLET ORAL ONCE
Status: COMPLETED | OUTPATIENT
Start: 2018-02-26 | End: 2018-02-26

## 2018-02-26 RX ORDER — ONDANSETRON 4 MG/1
4 TABLET, FILM COATED ORAL EVERY 8 HOURS PRN
Qty: 30 TABLET | Refills: 0 | Status: SHIPPED | OUTPATIENT
Start: 2018-02-26 | End: 2018-03-29

## 2018-02-26 RX ORDER — OXYCODONE HCL 10 MG/1
10 TABLET, FILM COATED, EXTENDED RELEASE ORAL ONCE
Status: COMPLETED | OUTPATIENT
Start: 2018-02-26 | End: 2018-02-26

## 2018-02-26 RX ORDER — FENTANYL CITRATE 50 UG/ML
INJECTION, SOLUTION INTRAMUSCULAR; INTRAVENOUS AS NEEDED
Status: DISCONTINUED | OUTPATIENT
Start: 2018-02-26 | End: 2018-02-26 | Stop reason: SURG

## 2018-02-26 RX ORDER — ROCURONIUM BROMIDE 10 MG/ML
INJECTION, SOLUTION INTRAVENOUS AS NEEDED
Status: DISCONTINUED | OUTPATIENT
Start: 2018-02-26 | End: 2018-02-26 | Stop reason: SURG

## 2018-02-26 RX ORDER — MELOXICAM 7.5 MG/1
15 TABLET ORAL ONCE
Status: COMPLETED | OUTPATIENT
Start: 2018-02-26 | End: 2018-02-26

## 2018-02-26 RX ORDER — KETOROLAC TROMETHAMINE 30 MG/ML
INJECTION, SOLUTION INTRAMUSCULAR; INTRAVENOUS AS NEEDED
Status: DISCONTINUED | OUTPATIENT
Start: 2018-02-26 | End: 2018-02-26 | Stop reason: SURG

## 2018-02-26 RX ORDER — SCOLOPAMINE TRANSDERMAL SYSTEM 1 MG/1
1 PATCH, EXTENDED RELEASE TRANSDERMAL ONCE
Status: DISCONTINUED | OUTPATIENT
Start: 2018-02-26 | End: 2018-02-26 | Stop reason: HOSPADM

## 2018-02-26 RX ORDER — LIDOCAINE HYDROCHLORIDE 10 MG/ML
0.5 INJECTION, SOLUTION EPIDURAL; INFILTRATION; INTRACAUDAL; PERINEURAL ONCE AS NEEDED
Status: DISCONTINUED | OUTPATIENT
Start: 2018-02-26 | End: 2018-02-26 | Stop reason: HOSPADM

## 2018-02-26 RX ORDER — EPHEDRINE SULFATE 50 MG/ML
INJECTION, SOLUTION INTRAVENOUS AS NEEDED
Status: DISCONTINUED | OUTPATIENT
Start: 2018-02-26 | End: 2018-02-26 | Stop reason: SURG

## 2018-02-26 RX ORDER — SODIUM CHLORIDE 9 MG/ML
INJECTION, SOLUTION INTRAVENOUS AS NEEDED
Status: DISCONTINUED | OUTPATIENT
Start: 2018-02-26 | End: 2018-02-26 | Stop reason: HOSPADM

## 2018-02-26 RX ORDER — MEPERIDINE HYDROCHLORIDE 25 MG/ML
12.5 INJECTION INTRAMUSCULAR; INTRAVENOUS; SUBCUTANEOUS
Status: DISCONTINUED | OUTPATIENT
Start: 2018-02-26 | End: 2018-02-26 | Stop reason: HOSPADM

## 2018-02-26 RX ORDER — ONDANSETRON 2 MG/ML
4 INJECTION INTRAMUSCULAR; INTRAVENOUS ONCE AS NEEDED
Status: DISCONTINUED | OUTPATIENT
Start: 2018-02-26 | End: 2018-02-26 | Stop reason: HOSPADM

## 2018-02-26 RX ORDER — SUCCINYLCHOLINE CHLORIDE 20 MG/ML
INJECTION INTRAMUSCULAR; INTRAVENOUS AS NEEDED
Status: DISCONTINUED | OUTPATIENT
Start: 2018-02-26 | End: 2018-02-26 | Stop reason: SURG

## 2018-02-26 RX ORDER — OXYCODONE HYDROCHLORIDE AND ACETAMINOPHEN 5; 325 MG/1; MG/1
1 TABLET ORAL ONCE AS NEEDED
Status: COMPLETED | OUTPATIENT
Start: 2018-02-26 | End: 2018-02-26

## 2018-02-26 RX ORDER — SODIUM CHLORIDE 9 MG/ML
40 INJECTION, SOLUTION INTRAVENOUS AS NEEDED
Status: DISCONTINUED | OUTPATIENT
Start: 2018-02-26 | End: 2018-02-26 | Stop reason: HOSPADM

## 2018-02-26 RX ORDER — PANTOPRAZOLE SODIUM 20 MG/1
40 TABLET, DELAYED RELEASE ORAL ONCE
Status: COMPLETED | OUTPATIENT
Start: 2018-02-26 | End: 2018-02-26

## 2018-02-26 RX ORDER — OXYCODONE HYDROCHLORIDE AND ACETAMINOPHEN 5; 325 MG/1; MG/1
1-2 TABLET ORAL EVERY 4 HOURS PRN
Qty: 60 TABLET | Refills: 0 | Status: SHIPPED | OUTPATIENT
Start: 2018-02-26 | End: 2018-03-06 | Stop reason: SINTOL

## 2018-02-26 RX ORDER — MIDAZOLAM HYDROCHLORIDE 1 MG/ML
1 INJECTION INTRAMUSCULAR; INTRAVENOUS
Status: DISCONTINUED | OUTPATIENT
Start: 2018-02-26 | End: 2018-02-26 | Stop reason: HOSPADM

## 2018-02-26 RX ORDER — ONDANSETRON 2 MG/ML
4 INJECTION INTRAMUSCULAR; INTRAVENOUS ONCE AS NEEDED
Status: COMPLETED | OUTPATIENT
Start: 2018-02-26 | End: 2018-02-26

## 2018-02-26 RX ORDER — BUPIVACAINE HYDROCHLORIDE 5 MG/ML
INJECTION, SOLUTION EPIDURAL; INTRACAUDAL AS NEEDED
Status: DISCONTINUED | OUTPATIENT
Start: 2018-02-26 | End: 2018-02-26 | Stop reason: SURG

## 2018-02-26 RX ORDER — LIDOCAINE HYDROCHLORIDE 20 MG/ML
INJECTION, SOLUTION INFILTRATION; PERINEURAL AS NEEDED
Status: DISCONTINUED | OUTPATIENT
Start: 2018-02-26 | End: 2018-02-26 | Stop reason: SURG

## 2018-02-26 RX ORDER — SODIUM CHLORIDE, SODIUM LACTATE, POTASSIUM CHLORIDE, CALCIUM CHLORIDE 600; 310; 30; 20 MG/100ML; MG/100ML; MG/100ML; MG/100ML
9 INJECTION, SOLUTION INTRAVENOUS CONTINUOUS
Status: DISCONTINUED | OUTPATIENT
Start: 2018-02-26 | End: 2018-02-26 | Stop reason: HOSPADM

## 2018-02-26 RX ORDER — SENNOSIDES 8.6 MG
1 TABLET ORAL NIGHTLY
Qty: 20 TABLET | Refills: 0 | Status: SHIPPED | OUTPATIENT
Start: 2018-02-26 | End: 2018-03-29

## 2018-02-26 RX ORDER — HYDROMORPHONE HCL 110MG/55ML
1 PATIENT CONTROLLED ANALGESIA SYRINGE INTRAVENOUS
Status: DISCONTINUED | OUTPATIENT
Start: 2018-02-26 | End: 2018-02-26 | Stop reason: HOSPADM

## 2018-02-26 RX ORDER — MIDAZOLAM HYDROCHLORIDE 1 MG/ML
2 INJECTION INTRAMUSCULAR; INTRAVENOUS
Status: DISCONTINUED | OUTPATIENT
Start: 2018-02-26 | End: 2018-02-26 | Stop reason: HOSPADM

## 2018-02-26 RX ORDER — FAMOTIDINE 10 MG/ML
20 INJECTION, SOLUTION INTRAVENOUS
Status: DISCONTINUED | OUTPATIENT
Start: 2018-02-26 | End: 2018-02-26 | Stop reason: HOSPADM

## 2018-02-26 RX ORDER — SODIUM CHLORIDE 0.9 % (FLUSH) 0.9 %
1-10 SYRINGE (ML) INJECTION AS NEEDED
Status: DISCONTINUED | OUTPATIENT
Start: 2018-02-26 | End: 2018-02-26 | Stop reason: HOSPADM

## 2018-02-26 RX ORDER — PROPOFOL 10 MG/ML
VIAL (ML) INTRAVENOUS AS NEEDED
Status: DISCONTINUED | OUTPATIENT
Start: 2018-02-26 | End: 2018-02-26 | Stop reason: SURG

## 2018-02-26 RX ORDER — DEXAMETHASONE SODIUM PHOSPHATE 4 MG/ML
8 INJECTION, SOLUTION INTRA-ARTICULAR; INTRALESIONAL; INTRAMUSCULAR; INTRAVENOUS; SOFT TISSUE ONCE AS NEEDED
Status: COMPLETED | OUTPATIENT
Start: 2018-02-26 | End: 2018-02-26

## 2018-02-26 RX ORDER — LIDOCAINE HYDROCHLORIDE AND EPINEPHRINE BITARTRATE 20; .01 MG/ML; MG/ML
INJECTION, SOLUTION SUBCUTANEOUS AS NEEDED
Status: DISCONTINUED | OUTPATIENT
Start: 2018-02-26 | End: 2018-02-26 | Stop reason: HOSPADM

## 2018-02-26 RX ORDER — GLYCOPYRROLATE 0.2 MG/ML
INJECTION INTRAMUSCULAR; INTRAVENOUS AS NEEDED
Status: DISCONTINUED | OUTPATIENT
Start: 2018-02-26 | End: 2018-02-26 | Stop reason: SURG

## 2018-02-26 RX ORDER — PREGABALIN 75 MG/1
75 CAPSULE ORAL ONCE
Status: COMPLETED | OUTPATIENT
Start: 2018-02-26 | End: 2018-02-26

## 2018-02-26 RX ORDER — MAGNESIUM HYDROXIDE 1200 MG/15ML
LIQUID ORAL AS NEEDED
Status: DISCONTINUED | OUTPATIENT
Start: 2018-02-26 | End: 2018-02-26 | Stop reason: HOSPADM

## 2018-02-26 RX ORDER — SODIUM CHLORIDE, SODIUM LACTATE, POTASSIUM CHLORIDE, CALCIUM CHLORIDE 600; 310; 30; 20 MG/100ML; MG/100ML; MG/100ML; MG/100ML
100 INJECTION, SOLUTION INTRAVENOUS CONTINUOUS
Status: DISCONTINUED | OUTPATIENT
Start: 2018-02-26 | End: 2018-02-26 | Stop reason: HOSPADM

## 2018-02-26 RX ADMIN — LIDOCAINE HYDROCHLORIDE 100 MG: 20 INJECTION, SOLUTION INFILTRATION; PERINEURAL at 07:34

## 2018-02-26 RX ADMIN — GLYCOPYRROLATE 0.4 MG: 0.2 INJECTION INTRAMUSCULAR; INTRAVENOUS at 08:39

## 2018-02-26 RX ADMIN — ACETAMINOPHEN 1000 MG: 500 TABLET, FILM COATED ORAL at 06:17

## 2018-02-26 RX ADMIN — OXYCODONE HYDROCHLORIDE 10 MG: 10 TABLET, FILM COATED, EXTENDED RELEASE ORAL at 06:17

## 2018-02-26 RX ADMIN — SODIUM CHLORIDE, POTASSIUM CHLORIDE, SODIUM LACTATE AND CALCIUM CHLORIDE: 600; 310; 30; 20 INJECTION, SOLUTION INTRAVENOUS at 06:48

## 2018-02-26 RX ADMIN — OXYCODONE HYDROCHLORIDE AND ACETAMINOPHEN 1 TABLET: 5; 325 TABLET ORAL at 09:43

## 2018-02-26 RX ADMIN — CEFAZOLIN SODIUM 2 G: 2 SOLUTION INTRAVENOUS at 07:29

## 2018-02-26 RX ADMIN — PROPOFOL 200 MG: 10 INJECTION, EMULSION INTRAVENOUS at 07:34

## 2018-02-26 RX ADMIN — FAMOTIDINE 20 MG: 10 INJECTION, SOLUTION INTRAVENOUS at 07:00

## 2018-02-26 RX ADMIN — PANTOPRAZOLE SODIUM 40 MG: 20 TABLET, DELAYED RELEASE ORAL at 06:18

## 2018-02-26 RX ADMIN — SUCCINYLCHOLINE CHLORIDE 120 MG: 20 INJECTION, SOLUTION INTRAMUSCULAR; INTRAVENOUS at 07:34

## 2018-02-26 RX ADMIN — PREGABALIN 75 MG: 75 CAPSULE ORAL at 06:17

## 2018-02-26 RX ADMIN — MIDAZOLAM 2 MG: 1 INJECTION INTRAMUSCULAR; INTRAVENOUS at 07:01

## 2018-02-26 RX ADMIN — EPHEDRINE SULFATE 10 MG: 50 INJECTION INTRAMUSCULAR; INTRAVENOUS; SUBCUTANEOUS at 08:25

## 2018-02-26 RX ADMIN — SCOPALAMINE 1 PATCH: 1 PATCH, EXTENDED RELEASE TRANSDERMAL at 07:01

## 2018-02-26 RX ADMIN — EPHEDRINE SULFATE 10 MG: 50 INJECTION INTRAMUSCULAR; INTRAVENOUS; SUBCUTANEOUS at 08:16

## 2018-02-26 RX ADMIN — ROCURONIUM BROMIDE 30 MG: 10 INJECTION INTRAVENOUS at 07:36

## 2018-02-26 RX ADMIN — DEXAMETHASONE SODIUM PHOSPHATE 8 MG: 4 INJECTION, SOLUTION INTRAMUSCULAR; INTRAVENOUS at 07:00

## 2018-02-26 RX ADMIN — MELOXICAM 15 MG: 7.5 TABLET ORAL at 06:17

## 2018-02-26 RX ADMIN — NEOSTIGMINE METHYLSULFATE 4 MG: 1 INJECTION, SOLUTION INTRAMUSCULAR; INTRAVENOUS; SUBCUTANEOUS at 08:39

## 2018-02-26 RX ADMIN — FENTANYL CITRATE 50 MCG: 50 INJECTION, SOLUTION INTRAMUSCULAR; INTRAVENOUS at 07:34

## 2018-02-26 RX ADMIN — FENTANYL CITRATE 50 MCG: 50 INJECTION, SOLUTION INTRAMUSCULAR; INTRAVENOUS at 07:43

## 2018-02-26 RX ADMIN — BUPIVACAINE HYDROCHLORIDE 20 ML: 5 INJECTION, SOLUTION EPIDURAL; INTRACAUDAL; PERINEURAL at 07:00

## 2018-02-26 RX ADMIN — KETOROLAC TROMETHAMINE 30 MG: 30 INJECTION INTRAMUSCULAR; INTRAVENOUS at 08:25

## 2018-02-26 RX ADMIN — ONDANSETRON 4 MG: 2 INJECTION, SOLUTION INTRAMUSCULAR; INTRAVENOUS at 07:03

## 2018-02-26 NOTE — ANESTHESIA POSTPROCEDURE EVALUATION
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]) MD Norah Morrison, CRNA          Anesthesia Type: general, regional  Last vitals  BP   121/56 (02/26/18 0927)   Temp   97.1 °F (36.2 °C) (02/26/18 0900)   Pulse   80 (02/26/18 0927)   Resp   16 (02/26/18 0927)     SpO2   96 % (02/26/18 0927)     Post Anesthesia Care and Evaluation    Patient location during evaluation: bedside  Patient participation: complete - patient participated  Level of consciousness: awake and alert  Pain score: 0  Pain management: adequate  Airway patency: patent  Anesthetic complications: No anesthetic complications    Cardiovascular status: acceptable  Respiratory status: acceptable  Hydration status: acceptable

## 2018-02-26 NOTE — ANESTHESIA PROCEDURE NOTES
Airway  Urgency: elective    Airway not difficult    General Information and Staff    Patient location during procedure: OR  CRNA: JOSE LUIS VAZQUEZ    Indications and Patient Condition  Indications for airway management: airway protection    Preoxygenated: yes  MILS maintained throughout  Mask difficulty assessment: 1 - vent by mask    Final Airway Details  Final airway type: endotracheal airway      Successful airway: ETT  Cuffed: yes   Successful intubation technique: direct laryngoscopy  Facilitating devices/methods: cricoid pressure and intubating stylet  Endotracheal tube insertion site: oral  Blade: Tobias  Blade size: #4  ETT size: 7.5 mm  Cormack-Lehane Classification: grade IIa - partial view of glottis  Placement verified by: chest auscultation and capnometry   Cuff volume (mL): 8  Measured from: lips  ETT to lips (cm): 21  Number of attempts at approach: 1

## 2018-02-26 NOTE — ANESTHESIA PROCEDURE NOTES
Peripheral Block    Patient location during procedure: pre-op  Start time: 2/26/2018 6:47 AM  Stop time: 2/26/2018 7:00 AM  Reason for block: at surgeon's request and post-op pain management  Performed by  CRNA: JOSE LUIS VAZQUEZ  Preanesthetic Checklist  Completed: patient identified, site marked, surgical consent, pre-op evaluation, timeout performed, IV checked, risks and benefits discussed and monitors and equipment checked  Prep:  Pt Position: sitting  Sterile barriers:cap, gloves, mask and sterile barriers  Prep: ChloraPrep  Patient monitoring: blood pressure monitoring, continuous pulse oximetry and EKG  Procedure  Sedation:yes  Performed under: PNB  Guidance:ultrasound guided  ULTRASOUND INTERPRETATION.  Using ultrasound guidance a 22 G gauge needle was placed in close proximity to the brachial plexus nerve, at which point, under ultrasound guidance anesthetic was injected in the area of the nerve and spread of the anesthesia was seen on ultrasound in close proximity thereto.  There were no abnormalities seen on ultrasound; a digital image was taken; and the patient tolerated the procedure with no complications. Images:still images obtained    Laterality:right  Block Type:interscalene  Injection Technique:single-shot  Needle Type:echogenic  Needle Gauge:21 G  Resistance on Injection: none  Medications  Local Injected:bupivacaine 0.5% Local Amount Injected:20mL

## 2018-02-26 NOTE — ANESTHESIA PREPROCEDURE EVALUATION
Anesthesia Evaluation     Patient summary reviewed and Nursing notes reviewed   history of anesthetic complications: PONV  NPO Solid Status: > 8 hours             Airway   Mallampati: II  TM distance: >3 FB  Neck ROM: full  no difficulty expected  Dental - normal exam     Pulmonary - negative pulmonary ROS and normal exam    breath sounds clear to auscultation  Cardiovascular - negative cardio ROS and normal exam    ECG reviewed  Rhythm: regular  Rate: normal        Neuro/Psych- negative ROS  GI/Hepatic/Renal/Endo - negative ROS     Musculoskeletal     (+) neck pain,   Abdominal    Substance History   (+) alcohol use ( occasional),      OB/GYN negative ob/gyn ROS         Other        ROS/Med Hx Other: SINUS RHYTHM  (ETRSR1) . RSR' IN V1 OR V2, RIGHT VCD OR RVH  (NPT) * NO PRIOR TRACING AVAILABLE FOR COMPARISON                  Anesthesia Plan    ASA 2     general and regional     intravenous induction   Anesthetic plan and risks discussed with patient.  Use of blood products discussed with patient  Consented to blood products.

## 2018-02-27 ENCOUNTER — OFFICE VISIT (OUTPATIENT)
Dept: ORTHOPEDIC SURGERY | Facility: CLINIC | Age: 43
End: 2018-02-27

## 2018-02-27 ENCOUNTER — TELEPHONE (OUTPATIENT)
Dept: ORTHOPEDIC SURGERY | Facility: CLINIC | Age: 43
End: 2018-02-27

## 2018-02-27 DIAGNOSIS — M75.81 TENDINITIS OF RIGHT ROTATOR CUFF: ICD-10-CM

## 2018-02-27 DIAGNOSIS — M75.41 SUBACROMIAL IMPINGEMENT OF RIGHT SHOULDER: ICD-10-CM

## 2018-02-27 DIAGNOSIS — Z98.890 STATUS POST ARTHROSCOPY OF SHOULDER: Primary | ICD-10-CM

## 2018-02-27 PROCEDURE — 99024 POSTOP FOLLOW-UP VISIT: CPT | Performed by: NURSE PRACTITIONER

## 2018-02-27 NOTE — PROGRESS NOTES
CC: Status post shoulder arthroscopy with extensive debridement, including debridement of rotator cuff tear, labrum, and subacromial bursitis, open distal clavicle excision, DOS 02/26/2018    HPI: Mr. Pisano presents back to clinic today with concerns of increased bloody drainage noted from dressing covering his incision. Denies that the incision is actively draining however would like to be assessed. Denies presence of numbness and tingling at right upper extremity. Reports increased pain this afternoon and believes that the nerve block has now worn off which is why the pain has increased. He does believe that the pain medication is helping to relieve discomfort. Denies all other concerns present at this time.     Assessment:   Status post shoulder arthroscopy, right shoulder  with extensive debridement, including debridement of rotator cuff tear, labrum, and subacromial bursitis, open distal clavicle excision    Plan:  1. Continue with sling at all times right upper extremity.  2. Dressing changed, covered with 4x4 gauze and tegaderm. Will return to clinic at previously scheduled appointment on 03/06/2018. Patient verbalized understanding of all information and agrees with plan of care. Denies all other concerns present at this time.

## 2018-03-06 ENCOUNTER — OFFICE VISIT (OUTPATIENT)
Dept: ORTHOPEDIC SURGERY | Facility: CLINIC | Age: 43
End: 2018-03-06

## 2018-03-06 DIAGNOSIS — Z98.890 STATUS POST ARTHROSCOPY OF SHOULDER: Primary | ICD-10-CM

## 2018-03-06 PROCEDURE — 99024 POSTOP FOLLOW-UP VISIT: CPT | Performed by: NURSE PRACTITIONER

## 2018-03-06 RX ORDER — HYDROCODONE BITARTRATE AND ACETAMINOPHEN 5; 325 MG/1; MG/1
1 TABLET ORAL EVERY 6 HOURS PRN
Qty: 40 TABLET | Refills: 0 | Status: SHIPPED | OUTPATIENT
Start: 2018-03-06 | End: 2018-03-29

## 2018-03-06 NOTE — PROGRESS NOTES
CC: F/u s/p right shoulder arthroscopy with extensive debridement, including debridement of rotator cuff tear, labrum, and subacromial bursitis, open distal clavicle excision, DOS 02/26/2018     Interval History: Patient returns to clinic stating pain is doing fairly well, has been using sling as instructed, denies any numbness or tingling over right arm. No fevers, chills, or sweats, and no drainage from incisions noted.    Exam:   Right shoulder- incisions clean, dry, prineo dressing intact   Positive sensation all distributions right hand and proximal lateral aspect arm, positive deltoid firing   Cap refill < 3 seconds, radial pulse 2+   Positive deltoid firing   Flex/extend fingers/thumb/wrist with 4+/5 strength, positive thumbs up, okay sign, cross finger adduction and abduction against resistance     Impression: s/p right shoulder arthroscopy with extensive debridement, including debridement of rotator cuff tear, labrum, and subacromial bursitis, open distal clavicle excision     Plan:  1. Discussed that he may shower however encouraged to avoid submerging wounds x 4 weeks  2. F/u in 3 wks with Dr Burgess  3. We will continue sling for 3 weeks total, follow shoulder arthroscopy protocol with no crossarm or extension activities for 4-6 weeks.  We will start physical therapy at week 2 to begin work on shoulder range of motion. Patient will be out of town next week therefore will start PT once he returns the following week.   4. All questions answered

## 2018-03-19 ENCOUNTER — TREATMENT (OUTPATIENT)
Dept: PHYSICAL THERAPY | Facility: CLINIC | Age: 43
End: 2018-03-19

## 2018-03-19 DIAGNOSIS — Z98.890 S/P ARTHROSCOPY OF SHOULDER: Primary | ICD-10-CM

## 2018-03-19 PROCEDURE — 97110 THERAPEUTIC EXERCISES: CPT | Performed by: PHYSICAL THERAPIST

## 2018-03-19 PROCEDURE — 97161 PT EVAL LOW COMPLEX 20 MIN: CPT | Performed by: PHYSICAL THERAPIST

## 2018-03-19 NOTE — PROGRESS NOTES
Physical Therapy Initial Evaluation and Plan of Care    Patient: Jalen Pisano   : 1975  Diagnosis/ICD-10 Code:  S/P arthroscopy of shoulder [Z98.890]  Referring practitioner: RASHAD Ryan    Subjective Evaluation    History of Present Illness  Date of surgery: 2018  Mechanism of injury: 3 weeks post op extensive debridement, including debridement of rotator cuff tear, labrum, and subacromial bursitis, open distal clavicle excision R shoulder.  10 year onset of symptoms of popping and became painful last summer.  Cortisone injection only lasted 6 weeks.      Shoulder is achy and uses Tylenol prn.  Stopped wearing the sling a week ago. Has had to avoid sleeping on R side but no real problem sleeping.  Pt hasn't been instructed yet on HEP.      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  Pain  Current pain ratin  At worst pain rating: 3  Quality: cramping  Progression: improved    Hand dominance: right    Diagnostic Tests  MRI studies: abnormal    Treatments  Previous treatment: injection treatment           Objective       Observations     Additional Observation Details  Well healing incision with no signs of infection    Tenderness     Right Shoulder  Tenderness in the biceps tendon (proximal) and supraspinatus tendon.     Active Range of Motion     Right Shoulder   Flexion: 164 degrees   Abduction: 173 (scaption) degrees   External rotation 45°: 82 degrees   Internal rotation 45°: 72 degrees     Additional Active Range of Motion Details  AAROM all planes    Strength/Myotome Testing     Right Shoulder     Planes of Motion   Flexion: 5   External rotation at 0°: 5   Internal rotation at 0°: 5     Isolated Muscles   Biceps: 5   Triceps: 5     Tests     Right Shoulder   Positive Solomon's.   Negative Liz's.          Assessment & Plan      Assessment  Impairments: abnormal or restricted ROM, activity intolerance, impaired physical strength and pain with function  Assessment details:  Jalen Pisano is a pleasant 42 y.o. male that presents with signs and symptoms consistent with the above diagnosis. Pt would benefit from skilled PT services in order to address listed impairments and increase tolerance to normal daily activities including ADLs, work and recreational activities.       Prognosis: good  Functional Limitations: carrying objects, lifting, sleeping, pushing, uncomfortable because of pain, reaching behind back and reaching overhead  Goals  Plan Goals: STG In 2-6 weeks  1. Pt to exhibit compliance/independence with HEP.  2. Pt to avoid cross arm/ext activities per protocol  3.  Improved sleep tolerance  4.  Pt to tolerate progression to CKC exercises     LTG In 6-12 weeks  1. Pt able to resume full duty work with use of power tools as needed for job.   2. Pain not > than 2/10 with ADLs  3. - impingement signs to allow for tolerance to OH activities.  4. Quick DASH < 15%      Plan  Therapy options: will be seen for skilled physical therapy services  Planned modality interventions: iontophoresis, ultrasound, electrical stimulation/Russian stimulation and cryotherapy  Planned therapy interventions: manual therapy, joint mobilization, neuromuscular re-education, strengthening, stretching and home exercise program  Frequency: 2x week  Duration in weeks: 12  Treatment plan discussed with: patient        Manual Therapy:    -     mins  12284;  Therapeutic Exercise:    15     mins  91068;     Neuromuscular Rae:    -    mins  44869;    Therapeutic Activity:     -     mins  75446;     Gait Training:      -     mins  47396;     Ultrasound:     -     mins  10467;    Electrical Stimulation:    -     mins  17430 ( );  Dry Needling     -     mins self-pay        Timed Treatment:   15   mins   Total Treatment:     45   mins    PT SIGNATURE: Viry  Rigoberto, PT   [place] License # [id number]  DATE TREATMENT INITIATED: 3/19/2018    Initial Certification  Certification Period: 6/17/2018  I certify that the therapy services are furnished while this patient is under my care.  The services outlined above are required by this patient, and will be reviewed every 90 days.     PHYSICIAN: Ifeoma Guardado, APRN      DATE:     Please sign and return via fax to [phone].. Thank you, [facility].

## 2018-03-19 NOTE — PATIENT INSTRUCTIONS
Access Code: [email or web address]   URL: [email or web address]   Date: 03/19/2018   Prepared by: Suzanne Lui Notes   Avoid moving your arm across your body or extending past your hip  Ice your shoulder for 10 minutes after exercise to reduce pain.     Exercises   Supine Shoulder Flexion with Dowel AAROM - Palms Up - 15 reps - 1 sets - 5 hold - 2x daily   Sidelying Shoulder External Rotation - 10 reps - 2 sets - 3 hold - 1x daily   Supine Shoulder Alphabet - 26 reps - 1 sets - 1x daily   Shoulder Scaption AAROM with Dowel - 15 reps - 1 sets - 5 hold - 2x daily   Shoulder External Rotation and Scapular Retraction - 10 reps - 1 sets - 5 second Hold - 2x daily

## 2018-03-22 ENCOUNTER — TREATMENT (OUTPATIENT)
Dept: PHYSICAL THERAPY | Facility: CLINIC | Age: 43
End: 2018-03-22

## 2018-03-22 DIAGNOSIS — Z98.890 S/P ARTHROSCOPY OF SHOULDER: Primary | ICD-10-CM

## 2018-03-22 PROCEDURE — 97110 THERAPEUTIC EXERCISES: CPT | Performed by: PHYSICAL THERAPIST

## 2018-03-22 NOTE — PROGRESS NOTES
Physical Therapy Daily Progress Note    Visit # : 2  Jalen Pisano reports: shoulder is a little more sore with exercises rated 3/10    Subjective     Objective   See Exercise, Manual, and Modality Logs for complete treatment.       Assessment/Plan  Good tolerance to exercise progression.  Did note some non painful popping in shoulder with pendulum likely due to lack of RTC stabilization.    Progress strengthening /stabilization /functional activity           Manual Therapy:    -     mins  47356;  Therapeutic Exercise:    30     mins  12082;     Neuromuscular Rae:    -    mins  70557;    Therapeutic Activity:     -     mins  54388;     Gait Training:      -     mins  24414;     Ultrasound:     -     mins  39060;    Electrical Stimulation:    -     mins  44909 ( );  Dry Needling     -     mins self-pay      Timed Treatment:   30   mins   Total Treatment:     40   mins        Viry Franklin PT  Physical Therapist  [place] License # [id number]

## 2018-03-22 NOTE — PATIENT INSTRUCTIONS
Access Code: [email or web address]   URL: [email or web address]   Date: 03/22/2018   Prepared by: Suzanne Franklin     Exercises   Seated Shoulder Inferior Glide - 2 reps - 1 sets - 20 hold - 2x daily   Circular Shoulder Pendulum with Table Support - 25 reps - 2 sets - 2x daily

## 2018-03-26 ENCOUNTER — TREATMENT (OUTPATIENT)
Dept: PHYSICAL THERAPY | Facility: CLINIC | Age: 43
End: 2018-03-26

## 2018-03-26 DIAGNOSIS — Z98.890 S/P ARTHROSCOPY OF SHOULDER: Primary | ICD-10-CM

## 2018-03-26 PROCEDURE — 97110 THERAPEUTIC EXERCISES: CPT | Performed by: PHYSICAL THERAPIST

## 2018-03-26 NOTE — PROGRESS NOTES
MD Note      Patient: Jalen Pisano   : 1975  Diagnosis/ICD-10 Code:  S/P arthroscopy of shoulder [Z98.890]  Referring practitioner: RASHAD Ryan  Date of Initial Visit: Type: THERAPY  Noted: 3/19/2018  Today's Date: 3/26/2018  Patient seen for 3 sessions      Subjective:   Clinical Progress: improved  Home Program Compliance: Yes  Treatment has included: therapeutic exercise and cryotherapy    Subjective Evaluation    History of Present Illness  Mechanism of injury: Overall feeling better; noticing improved ROM though some soreness with ADLs rated 1-4/10.  Has been using arm a lot.           Objective       Active Range of Motion   Left Shoulder   Internal rotation BTB: T6     Right Shoulder   Flexion: 180 degrees   Abduction: 180 degrees with pain  External rotation 45°: 90 degrees   Internal rotation BTB: T10 with pain    Strength/Myotome Testing     Right Shoulder     Planes of Motion   Flexion: 5   Abduction: 5   External rotation at 0°: 5   Internal rotation at 0°: 5     Isolated Muscles   Supraspinatus: 4     Tests     Left Shoulder   Negative Hawkin's.      Assessment & Plan     Assessment  Impairments: pain with function  Assessment details: Pt has made excellent gains in ROM though has end range pain.  Pt cautioned to avoid overstretching.  Good tolerated to exercise progression with light strengthening within protocol limits.          PT Signature: Viry Franklin PT  [place] License # [id number]    Manual Therapy:    -     mins  34281;  Therapeutic Exercise:    38     mins  14997;     Neuromuscular Rae:    -    mins  94636;    Therapeutic Activity:     -     mins  63399;     Gait Training:      -     mins  09809;     Ultrasound:     -     mins  87625;    Electrical Stimulation:    -     mins  28890 ( );  Dry Needling     -     mins self-pay      Timed Treatment:   38   mins   Total Treatment:     50   mins

## 2018-03-26 NOTE — PATIENT INSTRUCTIONS
Access Code: [email or web address]   URL: [email or web address]   Date: 03/26/2018   Prepared by: Suzanne Franklin     Exercises   Prone Single Arm Shoulder Horizontal Abduction with Scapular Retraction and Palm Down - 10 reps - 2 sets - 3 hold - 1x daily   Prone Shoulder Extension - Single Arm - 10 reps - 2 sets - 3 hold - 1x daily

## 2018-03-29 ENCOUNTER — TELEPHONE (OUTPATIENT)
Dept: ORTHOPEDIC SURGERY | Facility: CLINIC | Age: 43
End: 2018-03-29

## 2018-03-29 ENCOUNTER — OFFICE VISIT (OUTPATIENT)
Dept: ORTHOPEDIC SURGERY | Facility: CLINIC | Age: 43
End: 2018-03-29

## 2018-03-29 VITALS — BODY MASS INDEX: 40.18 KG/M2 | HEIGHT: 66 IN | WEIGHT: 250 LBS

## 2018-03-29 DIAGNOSIS — Z98.890 STATUS POST ARTHROSCOPY OF SHOULDER: Primary | ICD-10-CM

## 2018-03-29 PROCEDURE — 99024 POSTOP FOLLOW-UP VISIT: CPT | Performed by: ORTHOPAEDIC SURGERY

## 2018-03-29 NOTE — TELEPHONE ENCOUNTER
Got a hold of patients wife, she said she would text him and let him know he will be seen around 8:30, and that the time change will be fine.   Patients number didn't got to .

## 2018-03-30 ENCOUNTER — TREATMENT (OUTPATIENT)
Dept: PHYSICAL THERAPY | Facility: CLINIC | Age: 43
End: 2018-03-30

## 2018-03-30 DIAGNOSIS — Z98.890 S/P ARTHROSCOPY OF SHOULDER: Primary | ICD-10-CM

## 2018-03-30 PROCEDURE — 97110 THERAPEUTIC EXERCISES: CPT | Performed by: PHYSICAL THERAPIST

## 2018-03-30 NOTE — PROGRESS NOTES
Physical Therapy Daily Progress Note    Visit # : 4  Jalen Pisano reports: saw MD and is pleased with progress.  Mild soreness today and notes some non painful popping with ADLs.    Subjective     Objective   See Exercise, Manual, and Modality Logs for complete treatment.       Assessment/Plan  Good tolerance to exercise progression of light strengthening/NMR.  Pt issued red TB for HEP.  Progress per Plan of Care           Manual Therapy:    -     mins  88914;  Therapeutic Exercise:    32     mins  94753;     Neuromuscular Rae:    3    mins  75115;    Therapeutic Activity:     -     mins  79655;     Gait Training:      -     mins  58623;     Ultrasound:     -     mins  36785;    Electrical Stimulation:    -     mins  56942 ( );  Dry Needling     -     mins self-pay      Timed Treatment:   35   mins   Total Treatment:     45   mins        Viry Franklin PT  Physical Therapist  [place] License # [id number]

## 2018-04-02 ENCOUNTER — TREATMENT (OUTPATIENT)
Dept: PHYSICAL THERAPY | Facility: CLINIC | Age: 43
End: 2018-04-02

## 2018-04-02 DIAGNOSIS — Z98.890 S/P ARTHROSCOPY OF SHOULDER: Primary | ICD-10-CM

## 2018-04-02 PROCEDURE — 97110 THERAPEUTIC EXERCISES: CPT | Performed by: PHYSICAL THERAPIST

## 2018-04-02 NOTE — PATIENT INSTRUCTIONS
Access Code: [email or web address]   URL: [email or web address]   Date: 04/02/2018   Prepared by: Suzanne Franklin     Exercises   Standing Shoulder Flexion to 90 Degrees - 10 reps - 1 sets - 1x daily   Standing Shoulder Scaption - 10 reps - 1 sets - 1x daily

## 2018-04-02 NOTE — PROGRESS NOTES
Physical Therapy Daily Progress Note    Visit # : 5  Jalen Norris reports: my shoulder is feeling good    Subjective     Objective   See Exercise, Manual, and Modality Logs for complete treatment.       Assessment/Plan  Good tolerance to exercise progression.  Pt demonstrates good scap stability with elevation activities.   Progress per Plan of Care           Manual Therapy:    -     mins  59810;  Therapeutic Exercise:    30     mins  33311;     Neuromuscular Rae:    3    mins  53429;    Therapeutic Activity:     -     mins  09569;     Gait Training:      -     mins  31247;     Ultrasound:     -     mins  32551;    Electrical Stimulation:    -     mins  46602 ( );  Iontophoresis                 -     mins 43661      Timed Treatment:   33   mins   Total Treatment:     45   mins        Viry Franklin PT  Physical Therapist  [place] License # [id number]

## 2018-04-06 ENCOUNTER — TREATMENT (OUTPATIENT)
Dept: PHYSICAL THERAPY | Facility: CLINIC | Age: 43
End: 2018-04-06

## 2018-04-06 DIAGNOSIS — Z98.890 S/P ARTHROSCOPY OF SHOULDER: Primary | ICD-10-CM

## 2018-04-06 PROCEDURE — 97112 NEUROMUSCULAR REEDUCATION: CPT | Performed by: PHYSICAL THERAPIST

## 2018-04-06 PROCEDURE — 97110 THERAPEUTIC EXERCISES: CPT | Performed by: PHYSICAL THERAPIST

## 2018-04-06 NOTE — PROGRESS NOTES
Physical Therapy Daily Progress Note    Visit # : 6  Jalen Pisano reports: shoulder has been feeling better the last couple of days.     Subjective     Objective   See Exercise, Manual, and Modality Logs for complete treatment.       Assessment/Plan  Good tolerance to exercise progression; pt is exhibiting good scapulohumeral rhythm.    Progress per Plan of Care           Manual Therapy:    -     mins  42142;  Therapeutic Exercise:    30     mins  44747;     Neuromuscular Rae:    8    mins  78189;    Therapeutic Activity:     -     mins  57858;     Gait Training:      -     mins  30471;     Ultrasound:     -     mins  83386;    Electrical Stimulation:    -     mins  74613 ( );  Iontophoresis                 -     mins 02076      Timed Treatment:   38   mins   Total Treatment:     48   mins        Viry Franklin PT  Physical Therapist  [place] License # [id number]

## 2018-04-09 ENCOUNTER — TREATMENT (OUTPATIENT)
Dept: PHYSICAL THERAPY | Facility: CLINIC | Age: 43
End: 2018-04-09

## 2018-04-09 DIAGNOSIS — Z98.890 S/P ARTHROSCOPY OF SHOULDER: Primary | ICD-10-CM

## 2018-04-09 PROCEDURE — 97110 THERAPEUTIC EXERCISES: CPT | Performed by: PHYSICAL THERAPIST

## 2018-04-09 NOTE — PROGRESS NOTES
Physical Therapy Daily Progress Note    Visit # : 7  Jalen Pisano reports: shoulder is feeling pretty good    Subjective     Objective   See Exercise, Manual, and Modality Logs for complete treatment.       Assessment/Plan  Pt reporting fatigue with BTE activity and way able to increase wt on alphabets.  Pt is progressing well with exercise program.    Progress per Plan of Care           Manual Therapy:    -     mins  27995;  Therapeutic Exercise:    33     mins  08611;     Neuromuscular Rae:    3    mins  69326;    Therapeutic Activity:     -     mins  02494;     Gait Training:      -     mins  31426;     Ultrasound:     -     mins  71892;    Electrical Stimulation:    -     mins  82557 ( );  Iontophoresis                 -     mins 06963      Timed Treatment:   36   mins   Total Treatment:     48   mins        Viry Franklin PT  Physical Therapist  [place] License # [id number]

## 2018-04-09 NOTE — PROGRESS NOTES
CC: F/u s/p right shoulder extensive debridement and open distal clavicle excision  DOS 2/26/2018    Interval History: Patient returns to clinic stating pain is doing fairly well, has been intermittently been coming out of sling sling as instructed, denies any numbness or tingling over right arm. No fevers, chills, or sweats, and no drainage from incisions noted. He has started into physical therapy.  He has noting some occasional catching with mild associated pain over the anterolateral aspect of the right shoulder primarily with overhead and gentle crossarm activities.    Exam:   Right shoulder- incisions healing well   Tolerates FF- 135,   ER- 35   Positive sensation all distributions right hand and proximal lateral aspect arm, positive deltoid firing   Cap refill < 3 seconds, radial pulse 2+   Positive deltoid firing   Flex/extend fingers/thumb/wrist with 4+/5 strength, positive thumbs up, okay  sign, cross finger adduction and abduction against resistance     Impression: s/p right shoulder extensive debridement and open distal clavicle excision     Plan:  1. Continue PT for work on ROM and progressing into strengthening per protocol  2. Discontinue sling  3. Instructed on passive ROM exercises to be done multiple times daily at home  4. F/u in 4 weeks to evaluate motion and progress with PT  5. All questions answered      No orders of the defined types were placed in this encounter.      No orders of the defined types were placed in this encounter.

## 2018-04-12 ENCOUNTER — TREATMENT (OUTPATIENT)
Dept: PHYSICAL THERAPY | Facility: CLINIC | Age: 43
End: 2018-04-12

## 2018-04-12 DIAGNOSIS — Z98.890 S/P ARTHROSCOPY OF SHOULDER: Primary | ICD-10-CM

## 2018-04-12 PROCEDURE — 97110 THERAPEUTIC EXERCISES: CPT | Performed by: PHYSICAL THERAPIST

## 2018-04-12 NOTE — PROGRESS NOTES
Physical Therapy Daily Progress Note    Visit # : 8  Jalen Pisano reports: shoulder is achy today; rates pain at 4/10.      Subjective     Objective   See Exercise, Manual, and Modality Logs for complete treatment.       Assessment/Plan  Pt may be sore from BTE activity last visit so held that today.  Added light jt mob/oscillations for pain and recommended use of ice to control inflammatory symptoms.   Progress per Plan of Care           Manual Therapy:    5     mins  97274;  Therapeutic Exercise:    27     mins  40017;     Neuromuscular Rae:    3    mins  24942;    Therapeutic Activity:     -     mins  87574;     Gait Training:      -     mins  52579;     Ultrasound:     -     mins  22102;    Electrical Stimulation:    -     mins  04676 ( );  Iontophoresis                 -     mins 80156      Timed Treatment:   35   mins   Total Treatment:     50   mins        Viry Franklin PT  Physical Therapist  [place] License # [id number]

## 2018-04-16 ENCOUNTER — TREATMENT (OUTPATIENT)
Dept: PHYSICAL THERAPY | Facility: CLINIC | Age: 43
End: 2018-04-16

## 2018-04-16 DIAGNOSIS — Z98.890 S/P ARTHROSCOPY OF SHOULDER: Primary | ICD-10-CM

## 2018-04-16 PROCEDURE — 97110 THERAPEUTIC EXERCISES: CPT | Performed by: PHYSICAL THERAPIST

## 2018-04-16 PROCEDURE — 97140 MANUAL THERAPY 1/> REGIONS: CPT | Performed by: PHYSICAL THERAPIST

## 2018-04-16 NOTE — PROGRESS NOTES
Physical Therapy Daily Progress Note    Visit # : 9  Jalen Pisano reports: shoulder is still achy; has been doing a lot of mouse work at my desk.  Pain rated 1-4/10.      Subjective     Objective   See Exercise, Manual, and Modality Logs for complete treatment.       Assessment/Plan  Notable trigger pts in R upper trap/levator.  Pt educated on pillow prop tech and use of ice to control inflammation during the work day.  Progress per Plan of Care           Manual Therapy:    8     mins  34866;  Therapeutic Exercise:    23     mins  70999;     Neuromuscular Rae:    3    mins  31445;    Therapeutic Activity:     -     mins  93979;     Gait Training:      -     mins  99193;     Ultrasound:     -     mins  02922;    Electrical Stimulation:    -     mins  94514 ( );  Iontophoresis                 -     mins 17825      Timed Treatment:   34   mins   Total Treatment:     45   mins        Viry Franklin, PT  Physical Therapist  [place] License # [id number]

## 2018-04-19 NOTE — PROGRESS NOTES
Physical Therapy Daily Progress Note    Visit # : 10  Jalen Norris reports: my shoulder is feeling better this morning.      Subjective     Objective   See Exercise, Manual, and Modality Logs for complete treatment.       Assessment/Plan  Good tolerance to exercise progression today.  Inflammatory symptoms are decreasing  Progress per Plan of Care           Manual Therapy:    8     mins  52926;  Therapeutic Exercise:    22     mins  75564;     Neuromuscular Rae:    8    mins  48000;    Therapeutic Activity:     -     mins  36073;     Gait Training:      -     mins  56040;     Ultrasound:     -     mins  11676;    Electrical Stimulation:    -     mins  33697 ( );  Iontophoresis                 -     mins 76031      Timed Treatment:   38   mins   Total Treatment:     48   mins        Viry Franklin PT  Physical Therapist  [place] License # [id number]

## 2018-04-20 ENCOUNTER — TREATMENT (OUTPATIENT)
Dept: PHYSICAL THERAPY | Facility: CLINIC | Age: 43
End: 2018-04-20

## 2018-04-20 DIAGNOSIS — Z98.890 S/P ARTHROSCOPY OF SHOULDER: Primary | ICD-10-CM

## 2018-04-20 PROCEDURE — 97140 MANUAL THERAPY 1/> REGIONS: CPT | Performed by: PHYSICAL THERAPIST

## 2018-04-20 PROCEDURE — 97110 THERAPEUTIC EXERCISES: CPT | Performed by: PHYSICAL THERAPIST

## 2018-04-20 PROCEDURE — 97112 NEUROMUSCULAR REEDUCATION: CPT | Performed by: PHYSICAL THERAPIST

## 2018-04-22 NOTE — PROGRESS NOTES
MD note / Re-Certification      Patient: Jalen Pisano   : 1975  Diagnosis/ICD-10 Code:  S/P arthroscopy of shoulder [Z98.890]  Referring practitioner: RASHAD Ryan  Date of Initial Visit: Type: THERAPY  Noted: 3/19/2018  Today's Date: 2018  Patient seen for 11 sessions      Subjective:   Subjective Questionnaire: QuickDASH: 14%  Clinical Progress: improved  Home Program Compliance: Yes  Treatment has included: therapeutic exercise, neuromuscular re-education, manual therapy and cryotherapy    Subjective   Objective       Tenderness     Right Shoulder  No tenderness     Active Range of Motion   Left Shoulder   Internal rotation BTB: T8     Right Shoulder   Flexion: WFL  Abduction: WFL  External rotation 90°: WFL  Internal rotation BTB: T10     Strength/Myotome Testing     Right Shoulder     Planes of Motion   Flexion: 5   Abduction: 5   External rotation at 0°: 5   Internal rotation at 0°: 5     Isolated Muscles   Supraspinatus: 5     Tests     Right Shoulder   Positive Hawkin's.   Negative empty can and Neer's.      Assessment & Plan     Assessment  Assessment details: Pt is making steady progress toward goals and should continue to progress with continued compliance with HEP.       Progress toward previous goals: Partially Met    Goals  STG In 2-6 weeks - ALL MET  1. Pt to exhibit compliance/independence with HEP.  2. Pt to avoid cross arm/ext activities per protocol  3.  Improved sleep tolerance  4.  Pt to tolerate progression to CKC exercises     LTG In 6-12 weeks  1. Pt able to resume full duty work with use of power tools as needed for job. - progressing  2. Pain not > than 2/10 with ADLs - MET  3. - impingement signs to allow for tolerance to [place] activities.- progressing  4. Quick DASH < 15% - MET      Recommendations: Discharge with MD approval    PT Signature: Viry Franklin, PT  [place] License # [id number]    Manual Therapy:    8     mins  22229;  Therapeutic Exercise:    22     mins  74268;      Neuromuscular Rae:    8    mins  18434;    Therapeutic Activity:     -     mins  67899;     Gait Training:      -     mins  81829;     Ultrasound:     -     mins  20362;    Electrical Stimulation:    -     mins  21405 ( );  Iontophoresis                 -     mins 53850    Timed Treatment:   38   mins   Total Treatment:     50   mins

## 2018-04-23 ENCOUNTER — TREATMENT (OUTPATIENT)
Dept: PHYSICAL THERAPY | Facility: CLINIC | Age: 43
End: 2018-04-23

## 2018-04-23 DIAGNOSIS — Z98.890 S/P ARTHROSCOPY OF SHOULDER: Primary | ICD-10-CM

## 2018-04-23 PROCEDURE — 97110 THERAPEUTIC EXERCISES: CPT | Performed by: PHYSICAL THERAPIST

## 2018-04-23 PROCEDURE — 97140 MANUAL THERAPY 1/> REGIONS: CPT | Performed by: PHYSICAL THERAPIST

## 2018-04-23 PROCEDURE — 97112 NEUROMUSCULAR REEDUCATION: CPT | Performed by: PHYSICAL THERAPIST

## 2018-04-23 NOTE — PATIENT INSTRUCTIONS
Access Code: [email or web address]   URL: [email or web address]   Date: 04/23/2018   Prepared by: Suzanne Franklin     Exercises   Prone Shoulder Horizontal Abduction - 10 reps - 2 sets - 3 hold - 1x daily   Standing Shoulder Internal Rotation Stretch with Towel - 5 reps - 1 sets - 10 hold - 1x daily

## 2018-04-26 ENCOUNTER — OFFICE VISIT (OUTPATIENT)
Dept: ORTHOPEDIC SURGERY | Facility: CLINIC | Age: 43
End: 2018-04-26

## 2018-04-26 DIAGNOSIS — M19.019 AC JOINT ARTHROPATHY: ICD-10-CM

## 2018-04-26 DIAGNOSIS — M75.81 TENDINITIS OF RIGHT ROTATOR CUFF: ICD-10-CM

## 2018-04-26 DIAGNOSIS — Z98.890 STATUS POST ARTHROSCOPY OF SHOULDER: Primary | ICD-10-CM

## 2018-04-26 PROCEDURE — 99024 POSTOP FOLLOW-UP VISIT: CPT | Performed by: ORTHOPAEDIC SURGERY

## 2018-04-26 RX ORDER — NAPROXEN 250 MG/1
250 TABLET ORAL 2 TIMES DAILY PRN
COMMUNITY
End: 2019-06-27

## 2018-04-26 RX ORDER — DICLOFENAC SODIUM 75 MG/1
75 TABLET, DELAYED RELEASE ORAL 2 TIMES DAILY
Qty: 30 TABLET | Refills: 0 | Status: SHIPPED | OUTPATIENT
Start: 2018-04-26 | End: 2019-06-27

## 2018-04-26 NOTE — PROGRESS NOTES
I have reviewed the notes, assessments, and/or procedures performed by Marium Franklin, I concur with her/his documentation of Jalen Pisano.

## 2018-04-30 ENCOUNTER — OFFICE VISIT (OUTPATIENT)
Dept: FAMILY MEDICINE CLINIC | Facility: CLINIC | Age: 43
End: 2018-04-30

## 2018-04-30 ENCOUNTER — TREATMENT (OUTPATIENT)
Dept: PHYSICAL THERAPY | Facility: CLINIC | Age: 43
End: 2018-04-30

## 2018-04-30 VITALS
HEIGHT: 66 IN | RESPIRATION RATE: 18 BRPM | OXYGEN SATURATION: 98 % | BODY MASS INDEX: 41.95 KG/M2 | WEIGHT: 261 LBS | HEART RATE: 61 BPM | DIASTOLIC BLOOD PRESSURE: 90 MMHG | TEMPERATURE: 98.7 F | SYSTOLIC BLOOD PRESSURE: 148 MMHG

## 2018-04-30 DIAGNOSIS — M54.5 ACUTE LOW BACK PAIN, UNSPECIFIED BACK PAIN LATERALITY, WITH SCIATICA PRESENCE UNSPECIFIED: Primary | ICD-10-CM

## 2018-04-30 PROCEDURE — 97161 PT EVAL LOW COMPLEX 20 MIN: CPT | Performed by: PHYSICAL THERAPIST

## 2018-04-30 PROCEDURE — 72100 X-RAY EXAM L-S SPINE 2/3 VWS: CPT | Performed by: FAMILY MEDICINE

## 2018-04-30 PROCEDURE — 99213 OFFICE O/P EST LOW 20 MIN: CPT | Performed by: FAMILY MEDICINE

## 2018-04-30 PROCEDURE — 97110 THERAPEUTIC EXERCISES: CPT | Performed by: PHYSICAL THERAPIST

## 2018-04-30 PROCEDURE — G0283 ELEC STIM OTHER THAN WOUND: HCPCS | Performed by: PHYSICAL THERAPIST

## 2018-04-30 RX ORDER — CYCLOBENZAPRINE HCL 10 MG
10 TABLET ORAL 3 TIMES DAILY PRN
Qty: 21 TABLET | Refills: 1 | Status: SHIPPED | OUTPATIENT
Start: 2018-04-30 | End: 2019-06-27

## 2018-04-30 RX ORDER — MELOXICAM 15 MG/1
15 TABLET ORAL DAILY
Qty: 30 TABLET | Refills: 1 | Status: SHIPPED | OUTPATIENT
Start: 2018-04-30 | End: 2019-06-27

## 2018-04-30 NOTE — PROGRESS NOTES
Physical Therapy Initial Evaluation and Plan of Care    Patient: Jalen Pisano   : 1975  Diagnosis/ICD-10 Code:  Acute low back pain, unspecified back pain laterality, with sciatica presence unspecified [M54.5]  Referring practitioner: Jassi Ayala MD    Subjective Evaluation    History of Present Illness  Date of onset: 2018  Mechanism of injury: Set laptop on desk the day after squat/lunge workout and felt some LBP.  Had stained the deck the previous weekend.  Pain became progressively worse over the weekend and is having pain radiating into R anterior thigh with N/T. Worse upon awakening; gets better as the day goes on.      + sleep disturbance; woke up at 2 am and could barely walk.  Walking feels good.  - bowel/bladder     PMH of neck and shoulder pain.      Pain  Current pain ratin  At worst pain rating: 10  Location: L-S with pain radiating to R.  Quality: burning, cramping, tight, radiating and knife-like  Relieving factors: heat (stretching)  Aggravating factors: sleeping  Progression: worsening    Diagnostic Tests  X-ray: normal    Treatments  Previous treatment: medication  Patient Goals  Patient goals for therapy: decreased pain             Objective       Palpation     Right   No palpable tenderness to the erector spinae and piriformis.     Tenderness     Lumbar Spine  No tenderness in the spinous process.     Neurological Testing     Sensation     Lumbar   Left   Intact: light touch    Right   Intact: light touch    Reflexes   Left   Patellar (L4): normal (2+)  Achilles (S1): normal (2+)    Right   Patellar (L4): normal (2+)  Achilles (S1): normal (2+)    Active Range of Motion     Lumbar   Flexion: 103 degrees   Extension: 30 degrees with pain  Left lateral flexion: 30 degrees   Right lateral flexion: 30 degrees     Strength/Myotome Testing     Additional Strength Details  B LE myotomes 5/5    Tests       Thoracic   Negative slump.     Lumbar   Positive repeated flexion.     Right Hip    Negative TOBY and femoral nerve tension.   SLR: Negative.     Additional Tests Details  No pain with PA benja L1-5         Assessment & Plan     Assessment  Impairments: abnormal muscle tone, abnormal or restricted ROM, activity intolerance, impaired physical strength and pain with function  Assessment details:  Jalen Pisano is a pleasant 42 y.o. male that presents with signs and symptoms consistent with the above diagnosis. Pt is also showing possible signs of HNP with L2/3 radiculopathy.  Pt would benefit from skilled PT services in order to address listed impairments and increase tolerance to normal daily activities including ADLs, work and recreational activities.       Prognosis: good  Functional Limitations: lifting, sleeping, walking, uncomfortable because of pain, moving in bed and sitting  Goals  Plan Goals: STG In 2-6 weeks. Pt will:  1. Be independent with HEP  2. Perform Michael extension/lateral shift exercises with no increased pain  3. Report pain of </= 6/10 upon awakening  4. Pt to report decreased incidence of R LE pain    LTG In 6-12 weeks. Pt will:  1. Report pain of </= 2/10 with all ADLs  2. Demonstrate pain free lumbar AROM to allow for reaching and bending   3. - sleep disturbance  4. Minimal radicular complaints RLE to allow for ease of ambulation  5. Pt to resume workouts at gym/yard work without increased symptoms.        Plan  Therapy options: will be seen for skilled physical therapy services  Planned modality interventions: traction, electrical stimulation/Russian stimulation and thermotherapy (hydrocollator packs)  Planned therapy interventions: abdominal trunk stabilization, manual therapy, spinal/joint mobilization, strengthening, stretching, postural training, home exercise program and soft tissue mobilization  Frequency: 2x week  Duration in weeks: 12  Treatment plan discussed with: patient        Manual Therapy:    -     mins  14884;  Therapeutic Exercise:    10     mins   69821;     Neuromuscular Rae:    -    mins  86660;    Therapeutic Activity:     -     mins  06429;     Gait Training:      -     mins  72446;     Ultrasound:     -     mins  32881;    Electrical Stimulation:    20     mins  51965 ( );  Iontophoresis                 -     mins 51842      Timed Treatment:   10   mins   Total Treatment:     62   mins    PT SIGNATURE: BRADLEY Rios License # [id number]  DATE TREATMENT INITIATED: 4/30/2018    Initial Certification  Certification Period: 7/29/2018  I certify that the therapy services are furnished while this patient is under my care.  The services outlined above are required by this patient, and will be reviewed every 90 days.     PHYSICIAN: Jassi Ayala MD      DATE:     Please sign and return via fax to [phone].. Thank you, [facility].

## 2018-04-30 NOTE — PROGRESS NOTES
SUBJECTIVE:  The patient is a 42-year-old white male who comes in with a 5 day history of low back pain.  He was putting his laptop on his desk and is slightly bent position and the pain started.  He took some baclofen and diclofenac but has not had much relief.  He has applied ice and heat.  He will occasionally get a burning sensation in his white quadricep region.  No GI or  symptoms.    PAST MEDICAL HISTORY:  Reviewed.    REVIEW OF SYSTEMS:  Please see above; 14 point ROS negative.      OBJECTIVE: Vitals signs are reviewed and are stable.    Back: Mild tenderness is present in the lower mid lumbar region.  Some discomfort is present with flexion.  Range of motion is full.  Straight leg raises negative.  Neurological: Grossly intact without motor or sensory deficits.  Deep tendon reflexes symmetrical  Abdomen:   Soft, nontender.   Extremities:  No cyanosis, clubbing or edema.     Two-view x-ray of the lumbar spine is done here and interpreted by me.  None for comparison.  Indication low back pain.  X-ray shows no acute abnormalities.    ASSESSMENT:   Lumbar pain     PLAN: Discontinue diclofenac and baclofen.  Change to Flexeril 10 mg 3 times a day and Lopid 15 mg daily.  Refer to physical therapy.

## 2018-04-30 NOTE — PATIENT INSTRUCTIONS
Access Code: [email or web address]   URL: [email or web address]   Date: 04/30/2018   Prepared by: Suzanne Franklin     Exercises   Prone Static Back Extension on Pillows - 1 reps - 1 sets - 3 minutes hold - 1x daily   Prone Press Up on Elbows - 10 reps - 1 sets - 10 hold - 3x daily   Prone Press Up - 15 reps - 1 sets - 2x daily   Left Standing Lateral Shift Correction at Wall - Hold - 20 reps - 1 sets - 1x daily

## 2018-05-02 ENCOUNTER — TREATMENT (OUTPATIENT)
Dept: PHYSICAL THERAPY | Facility: CLINIC | Age: 43
End: 2018-05-02

## 2018-05-02 DIAGNOSIS — M54.5 ACUTE LOW BACK PAIN, UNSPECIFIED BACK PAIN LATERALITY, WITH SCIATICA PRESENCE UNSPECIFIED: Primary | ICD-10-CM

## 2018-05-02 PROCEDURE — G0283 ELEC STIM OTHER THAN WOUND: HCPCS | Performed by: PHYSICAL THERAPIST

## 2018-05-02 PROCEDURE — 97110 THERAPEUTIC EXERCISES: CPT | Performed by: PHYSICAL THERAPIST

## 2018-05-02 NOTE — PATIENT INSTRUCTIONS
Access Code: [email or web address]   URL: [email or web address]   Date: 05/02/2018   Prepared by: Suzanne Franklin     Exercises   Prone Femoral Nerve Mobilization - 15 reps - 1 sets - 3s hold - 2x daily   Standing Lumbar Extension - 15 reps - 1 sets - 2x daily   Shoulder Extension with Resistance - 10 reps - 2 sets - 5 hold - 1x daily     Pt issued blue tubing for HEP

## 2018-05-07 ENCOUNTER — TREATMENT (OUTPATIENT)
Dept: PHYSICAL THERAPY | Facility: CLINIC | Age: 43
End: 2018-05-07

## 2018-05-07 DIAGNOSIS — M54.5 ACUTE LOW BACK PAIN, UNSPECIFIED BACK PAIN LATERALITY, WITH SCIATICA PRESENCE UNSPECIFIED: Primary | ICD-10-CM

## 2018-05-07 PROCEDURE — G0283 ELEC STIM OTHER THAN WOUND: HCPCS | Performed by: PHYSICAL THERAPIST

## 2018-05-07 PROCEDURE — 97110 THERAPEUTIC EXERCISES: CPT | Performed by: PHYSICAL THERAPIST

## 2018-05-07 NOTE — PATIENT INSTRUCTIONS
Access Code: [email or web address]   URL: [email or web address]   Date: 05/07/2018   Prepared by: Suzanne Franklin     Exercises   Supine Transversus Abdominis Bracing with Heel Slide - 10 reps - 1 sets - 1x daily   Supine Multifidus with Heel Press - 10 reps - 1 sets - 5 hold - 1x daily

## 2018-05-07 NOTE — PROGRESS NOTES
Physical Therapy Daily Progress Note    Visit # : 3  Jalen Pisano reports: pt reports on that he was cleaning up a spilled milkshake on hands and knees and then felt a pop in back and has steadily improving.  Stiff and sore upon awakening but it is easier to get up for the day.  LE symptoms are diminishing; has been walking a couple of times a day.      Subjective     Objective   See Exercise, Manual, and Modality Logs for complete treatment.       Assessment/Plan  Good tolerance to exercise progression.  Pt is making steady progress toward goals.    Progress per Plan of Care           Manual Therapy:    -     mins  83427;  Therapeutic Exercise:    25     mins  68481;     Neuromuscular Rae:    -    mins  39477;    Therapeutic Activity:     -     mins  58440;     Gait Training:      -     mins  98142;     Ultrasound:     -     mins  65435;    Electrical Stimulation:    20     mins  05888 ( );  Iontophoresis                 -     mins 65546      Timed Treatment:   25   mins   Total Treatment:     46   mins        Viry Franklin, PT  Physical Therapist  [place] License # [id number]

## 2018-05-09 NOTE — PROGRESS NOTES
CC: F/u s/p right shoulder extensive debridement and open distal clavicle excision  DOS 2/26/2018    Interval History: Patient returns to clinic stating shoulder is doing very well this point time, he is having no pain with motion or lifting activities, notes very minimal irritation with repetitive lifting overhead and this is improving with continued work with therapy as well as home exercises.  Denies any issues with incisions, no fevers chills or sweats.  Exam:   Right shoulder- incisions well-healed   Active forward flexion 175°, active external rotation 50°, active internal rotation L1, 4+ out of 5 strength in all planes of motion, no pain on crossarm testing   Positive sensation all distributions right hand and proximal lateral aspect arm, positive deltoid firing   Cap refill < 3 seconds, radial pulse 2+   Positive deltoid firing   Flex/extend fingers/thumb/wrist with 4+/5 strength, positive thumbs up, okay  sign, cross finger adduction and abduction against resistance     Impression: s/p right shoulder extensive debridement and open distal clavicle excision     Plan:  1.  Patient is a very well this point time, I did give him a prescription today for diclofenac if he has any recurrence of bursitis type pain particularly over the lateral aspect of the right shoulder.  He has no tenderness focally over the acromioclavicular joint is able to tolerate full range of motion with good strength.  Continue working on home exercises, may wean out of physical therapy as tolerated.  Follow-up as needed should he have recurrence of symptoms or issues.    New Medications Ordered This Visit   Medications   • diclofenac (VOLTAREN) 75 MG EC tablet     Sig: Take 1 tablet by mouth 2 (Two) Times a Day.     Dispense:  30 tablet     Refill:  0       No orders of the defined types were placed in this encounter.

## 2018-05-11 ENCOUNTER — TREATMENT (OUTPATIENT)
Dept: PHYSICAL THERAPY | Facility: CLINIC | Age: 43
End: 2018-05-11

## 2018-05-11 DIAGNOSIS — M54.5 ACUTE LOW BACK PAIN, UNSPECIFIED BACK PAIN LATERALITY, WITH SCIATICA PRESENCE UNSPECIFIED: Primary | ICD-10-CM

## 2018-05-11 PROCEDURE — 97140 MANUAL THERAPY 1/> REGIONS: CPT | Performed by: PHYSICAL THERAPIST

## 2018-05-11 PROCEDURE — 97110 THERAPEUTIC EXERCISES: CPT | Performed by: PHYSICAL THERAPIST

## 2018-05-11 PROCEDURE — G0283 ELEC STIM OTHER THAN WOUND: HCPCS | Performed by: PHYSICAL THERAPIST

## 2018-05-11 PROCEDURE — 97012 MECHANICAL TRACTION THERAPY: CPT | Performed by: PHYSICAL THERAPIST

## 2018-05-11 NOTE — PROGRESS NOTES
Physical Therapy Daily Progress Note    Visit # : 4  Jalensimran Pisano reports: I've back tracked over the last couple of days with R groin pain and anterior N/T.  Will be going out of town over the weekend for a 9 hour car ride.      Subjective     Objective   See Exercise, Manual, and Modality Logs for complete treatment.       Assessment/Plan  Pt educated on use of lumbar roll during long drive in addition to frequent stops to perform lumbar extensions and walk.  Pt tolerated addition of lumbar traction well.   Progress per Plan of Care           Manual Therapy:    8     mins  78958;  Therapeutic Exercise:    15     mins  66208;     Neuromuscular Rae:    -    mins  11789;    Therapeutic Activity:     -     mins  96375;     Gait Training:      -     mins  38162;     Ultrasound:     -     mins  59137;    Electrical Stimulation:    20     mins  09923 ( );  Mechanical traction        15     mins  97906;        Timed Treatment:   23   mins   Total Treatment:     65   mins        Viry Franklin, PT  Physical Therapist  [place] License # [id number]

## 2018-05-11 NOTE — PATIENT INSTRUCTIONS
Access Code: [email or web address]   URL: [email or web address]   Date: 05/11/2018   Prepared by: Suzanne Franklin     Exercises   Sidelying Thoracic Lumbar Rotation - 10 reps - 1 sets - 10 hold - 2x daily

## 2018-05-16 ENCOUNTER — TREATMENT (OUTPATIENT)
Dept: PHYSICAL THERAPY | Facility: CLINIC | Age: 43
End: 2018-05-16

## 2018-05-16 DIAGNOSIS — M54.5 ACUTE LOW BACK PAIN, UNSPECIFIED BACK PAIN LATERALITY, WITH SCIATICA PRESENCE UNSPECIFIED: Primary | ICD-10-CM

## 2018-05-16 PROCEDURE — 97110 THERAPEUTIC EXERCISES: CPT | Performed by: PHYSICAL THERAPIST

## 2018-05-16 NOTE — PROGRESS NOTES
Physical Therapy Daily Progress Note    Visit # : 5  Jalen Norris reports: today is the best day for my back so far with no radicular numbness.  Worked in the yard yesterday which actually made my back feel better    Subjective     Objective   See Exercise, Manual, and Modality Logs for complete treatment.       Assessment/Plan  Good tolerance to exercise progression today; did not utilize modalities.  Pt is responding favorably to skilled PT with improved tolerance to ADLs/morning routine  Progress per Plan of Care           Manual Therapy:    -     mins  62495;  Therapeutic Exercise:    38     mins  71822;     Neuromuscular Rae:    -    mins  75435;    Therapeutic Activity:     -     mins  77269;     Gait Training:      -     mins  94940;     Ultrasound:     -     mins  95151;    Electrical Stimulation:    -     mins  01816 ( );  Iontophoresis                 -     mins 78311      Timed Treatment:   38   mins   Total Treatment:     38   mins        Viry Franklin PT  Physical Therapist  [place] License # [id number]

## 2018-05-16 NOTE — PATIENT INSTRUCTIONS
Access Code: [email or web address]   URL: [email or web address]   Date: 05/16/2018   Prepared by: Suzanne Franklin     Exercises   Standing Shoulder Flexion Reactive Isometric - 10 reps - 1 sets - 1x daily   Dead Bug - 10 reps - 1 sets - 1x daily   Supine Bridge - 10 reps - 1 sets - 1x daily

## 2018-05-18 ENCOUNTER — TREATMENT (OUTPATIENT)
Dept: PHYSICAL THERAPY | Facility: CLINIC | Age: 43
End: 2018-05-18

## 2018-05-18 DIAGNOSIS — M54.5 ACUTE LOW BACK PAIN, UNSPECIFIED BACK PAIN LATERALITY, WITH SCIATICA PRESENCE UNSPECIFIED: Primary | ICD-10-CM

## 2018-05-18 PROCEDURE — 97110 THERAPEUTIC EXERCISES: CPT | Performed by: PHYSICAL THERAPIST

## 2018-05-18 NOTE — PATIENT INSTRUCTIONS
Access Code: [email or web address]   URL: [email or web address]   Date: 05/18/2018   Prepared by: Suzanne Jefferson on Table - 10 reps - 1 sets - 3 hold - 1x daily

## 2018-05-18 NOTE — PROGRESS NOTES
Physical Therapy Daily Progress Note    Visit # : 6  Jalen Pisano reports: this is the first morning that I've woken up without pain.  Back started to feel a bit stiff this morning.      Subjective     Objective   See Exercise, Manual, and Modality Logs for complete treatment.       Assessment/Plan  Pt is making steady progress toward goals with centralization of symptoms and is tolerated strengthening/core stability well.  Pt encouraged to continue regular walking program.   Progress per Plan of Care           Manual Therapy:    -     mins  24493;  Therapeutic Exercise:    36     mins  57411;     Neuromuscular Rae:    -    mins  58209;    Therapeutic Activity:     -     mins  12012;     Gait Training:      -     mins  93902;     Ultrasound:     -     mins  41292;    Electrical Stimulation:    -     mins  26188 ( );  Iontophoresis                 -     mins 09160      Timed Treatment:   36   mins   Total Treatment:     36   mins        Viry Franklin PT  Physical Therapist  [place] License # [id number]

## 2018-05-21 ENCOUNTER — TREATMENT (OUTPATIENT)
Dept: PHYSICAL THERAPY | Facility: CLINIC | Age: 43
End: 2018-05-21

## 2018-05-21 DIAGNOSIS — M54.5 ACUTE LOW BACK PAIN, UNSPECIFIED BACK PAIN LATERALITY, WITH SCIATICA PRESENCE UNSPECIFIED: Primary | ICD-10-CM

## 2018-05-21 PROCEDURE — 97110 THERAPEUTIC EXERCISES: CPT | Performed by: PHYSICAL THERAPIST

## 2018-05-21 NOTE — PROGRESS NOTES
Physical Therapy Daily Progress Note    Visit # : 7  Jalen Pisano reports: feeling much better; woke up with minimal pain and was able to mow the lawn over the weekend.  Went for a walk this morning.      Subjective     Objective   See Exercise, Manual, and Modality Logs for complete treatment.   Full, non painful trunk AROM  No radicular complaints with repetitive trunk flexion    Assessment/Plan  Pt reported mild R upper trap soreness with seated row.  Pt instructed on levator/rhomboid stretches and suggested pt may want to decrease wt in the future.  Pt has met all STG/LTG and will discharge with HEP at this time unless symptoms return.    Other           Manual Therapy:    -     mins  16041;  Therapeutic Exercise:    38     mins  48405;     Neuromuscular Rae:    -    mins  29388;    Therapeutic Activity:     -     mins  33461;     Gait Training:      -     mins  55748;     Ultrasound:     -     mins  56832;    Electrical Stimulation:    -     mins  27274 ( );  Iontophoresis                 -     mins 59994      Timed Treatment:   38   mins   Total Treatment:     38   mins        Viry Franklin PT  Physical Therapist  [place] License # [id number]

## 2018-05-21 NOTE — PATIENT INSTRUCTIONS
Access Code: [email or web address]   URL: [email or web address]   Date: 05/21/2018   Prepared by: Suzanne Franklin     Exercises   Gentle Levator Scapulae Stretch - 2 reps - 1 sets - 20 hold - 1x daily   Standing Shoulder Posterior Capsule Stretch - 2 reps - 1 sets - 20 hold - 1x daily

## 2018-06-27 ENCOUNTER — DOCUMENTATION (OUTPATIENT)
Dept: PHYSICAL THERAPY | Facility: CLINIC | Age: 43
End: 2018-06-27

## 2018-06-27 NOTE — PROGRESS NOTES
Discharge Summary  Discharge Summary from Physical Therapy Report      Dates  PT visit: 4/30-5/25/18  Number of Visits: 7     Discharge Status of Patient: See progress Note dated 5/21/18    Goals: All Met    Discharge Plan: Continue with current home exercise program as instructed    Date of Discharge 6/27/18        Viry Franklin, PT  Physical Therapist

## 2018-09-16 ENCOUNTER — OFFICE VISIT (OUTPATIENT)
Dept: RETAIL CLINIC | Facility: CLINIC | Age: 43
End: 2018-09-16
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
## 2018-09-16 DIAGNOSIS — L30.9 DERMATITIS: Primary | ICD-10-CM

## 2018-09-16 PROCEDURE — 99213 OFFICE O/P EST LOW 20 MIN: CPT | Performed by: NURSE PRACTITIONER

## 2018-09-16 RX ORDER — DOXYCYCLINE HYCLATE 100 MG/1
100 CAPSULE ORAL 2 TIMES DAILY
Qty: 20 CAPSULE | Refills: 0 | Status: SHIPPED | OUTPATIENT
Start: 2018-09-16 | End: 2018-09-26

## 2018-09-16 NOTE — PATIENT INSTRUCTIONS
Tick Bite Information, Adult  Ticks are insects that can bite. Most ticks live in shrubs and grassy areas. They climb onto people and animals that go by. Then they bite. Some ticks carry germs that can make you sick.  How can I prevent tick bites?  · Use an insect repellent that has 20% or higher of the ingredients DEET, picaridin, or QC9186. Put this insect repellent on:  ? Bare skin.  ? The tops of your boots.  ? Your pant legs.  ? The ends of your sleeves.  · If you use an insect repellent that has the ingredient permethrin, make sure to follow the instructions on the bottle. Treat the following:  ? Clothing.  ? Supplies.  ? Boots.  ? Tents.  · Wear long sleeves, long pants, and light colors.  · Tuck your pant legs into your socks.  · Stay in the middle of the trail.  · Try not to walk through long grass.  · Before going inside your house, check your clothes, hair, and skin for ticks. Make sure to check your head, neck, armpits, waist, groin, and joint areas.  · Check for ticks every day.  · When you come indoors:  ? Wash your clothes right away.  ? Shower right away.  ? Dry your clothes in a dryer on high heat for 60 minutes or more.  What is the right way to remove a tick?  Remove a tick from your skin as soon as possible.  · To remove a tick that is crawling on your skin:  ? Go outdoors and brush the tick off.  ? Use tape or a lint roller.  · To remove a tick that is biting:  ? Wash your hands.  ? If you have latex gloves, put them on.  ? Use tweezers, curved forceps, or a tick-removal tool to grasp the tick. Grasp the tick as close to your skin and as close to the tick's head as possible.  ? Gently pull up until the tick lets go.  § Try to keep the tick's head attached to its body.  § Do not twist or jerk the tick.  § Do not squeeze or crush the tick.    Do not try to remove a tick with heat, alcohol, petroleum jelly, or fingernail polish.  How should I get rid of a tick?  Here are some ways to get rid of a  tick that is alive:  · Place the tick in rubbing alcohol.  · Place the tick in a bag or container you can close tightly.  · Wrap the tick tightly in tape.  · Flush the tick down the toilet.    Contact a doctor if:  · You have symptoms of a disease, such as:  ? Pain in a muscle, joint, or bone.  ? Trouble walking or moving your legs.  ? Numbness in your legs.  ? Inability to move (paralysis).  ? A red rash that makes a Teller (bull's-eye rash).  ? Redness and swelling where the tick bit you.  ? A fever.  ? Throwing up (vomiting) over and over.  ? Diarrhea.  ? Weight loss.  ? Tender and swollen lymph glands.  ? Shortness of breath.  ? Cough.  ? Belly pain (abdominal pain).  ? Headache.  ? Being more tired than normal.  ? A change in how alert (conscious) you are.  ? Confusion.  Get help right away if:  · You cannot remove a tick.  · A part of a tick breaks off and gets stuck in your skin.  · You are feeling worse.  Summary  · Ticks may carry germs that can make you sick.  · To prevent tick bites, wear long sleeves, long pants, and light colors. Use insect repellent. Follow the instructions on the bottle.  · If the tick is biting, do not try to remove it with heat, alcohol, petroleum jelly, or fingernail polish.  · Use tweezers, curved forceps, or a tick-removal tool to grasp the tick. Gently pull up until the tick lets go. Do not twist or jerk the tick. Do not squeeze or crush the tick.  · If you have symptoms, contact a doctor.  This information is not intended to replace advice given to you by your health care provider. Make sure you discuss any questions you have with your health care provider.  Document Released: 03/14/2011 Document Revised: 03/30/2018 Document Reviewed: 03/30/2018  Elsevier Interactive Patient Education © 2018 [employer]  Contact Dermatitis  Dermatitis is redness, soreness, and swelling (inflammation) of the skin. Contact dermatitis is a reaction to certain substances that touch the skin.  There are two types of contact dermatitis:  · Irritant contact dermatitis. This type is caused by something that irritates your skin, such as dry hands from washing them too much. This type does not require previous exposure to the substance for a reaction to occur. This type is more common.  · Allergic contact dermatitis. This type is caused by a substance that you are allergic to, such as a nickel allergy or poison ivy. This type only occurs if you have been exposed to the substance (allergen) before. Upon a repeat exposure, your body reacts to the substance. This type is less common.    What are the causes?  Many different substances can cause contact dermatitis. Irritant contact dermatitis is most commonly caused by exposure to:  · Makeup.  · Soaps.  · Detergents.  · Bleaches.  · Acids.  · Metal salts, such as nickel.    Allergic contact dermatitis is most commonly caused by exposure to:  · Poisonous plants.  · Chemicals.  · Jewelry.  · Latex.  · Medicines.  · Preservatives in products, such as clothing.    What increases the risk?  This condition is more likely to develop in:  · People who have jobs that expose them to irritants or allergens.  · People who have certain medical conditions, such as asthma or eczema.    What are the signs or symptoms?  Symptoms of this condition may occur anywhere on your body where the irritant has touched you or is touched by you. Symptoms include:  · Dryness or flaking.  · Redness.  · Cracks.  · Itching.  · Pain or a burning feeling.  · Blisters.  · Drainage of small amounts of blood or clear fluid from skin cracks.    With allergic contact dermatitis, there may also be swelling in areas such as the eyelids, mouth, or genitals.  How is this diagnosed?  This condition is diagnosed with a medical history and physical exam. A patch skin test may be performed to help determine the cause. If the condition is related to your job, you may need to see an occupational medicine  specialist.  How is this treated?  Treatment for this condition includes figuring out what caused the reaction and protecting your skin from further contact. Treatment may also include:  · Steroid creams or ointments. Oral steroid medicines may be needed in more severe cases.  · Antibiotics or antibacterial ointments, if a skin infection is present.  · Antihistamine lotion or an antihistamine taken by mouth to ease itching.  · A bandage (dressing).    Follow these instructions at home:  Skin Care  · Moisturize your skin as needed.  · Apply cool compresses to the affected areas.  · Try taking a bath with:  ? Epsom salts. Follow the instructions on the packaging. You can get these at your local pharmacy or grocery store.  ? Baking soda. Pour a small amount into the bath as directed by your health care provider.  ? Colloidal oatmeal. Follow the instructions on the packaging. You can get this at your local pharmacy or grocery store.  · Try applying baking soda paste to your skin. Stir water into baking soda until it reaches a paste-like consistency.  · Do not scratch your skin.  · Bathe less frequently, such as every other day.  · Bathe in lukewarm water. Avoid using hot water.  Medicines  · Take or apply over-the-counter and prescription medicines only as told by your health care provider.  · If you were prescribed an antibiotic medicine, take or apply your antibiotic as told by your health care provider. Do not stop using the antibiotic even if your condition starts to improve.  General instructions  · Keep all follow-up visits as told by your health care provider. This is important.  · Avoid the substance that caused your reaction. If you do not know what caused it, keep a journal to try to track what caused it. Write down:  ? What you eat.  ? What cosmetic products you use.  ? What you drink.  ? What you wear in the affected area. This includes jewelry.  · If you were given a dressing, take care of it as told by  your health care provider. This includes when to change and remove it.  Contact a health care provider if:  · Your condition does not improve with treatment.  · Your condition gets worse.  · You have signs of infection such as swelling, tenderness, redness, soreness, or warmth in the affected area.  · You have a fever.  · You have new symptoms.  Get help right away if:  · You have a severe headache, neck pain, or neck stiffness.  · You vomit.  · You feel very sleepy.  · You notice red streaks coming from the affected area.  · Your bone or joint underneath the affected area becomes painful after the skin has healed.  · The affected area turns darker.  · You have difficulty breathing.  This information is not intended to replace advice given to you by your health care provider. Make sure you discuss any questions you have with your health care provider.  Document Released: 12/15/2001 Document Revised: 05/25/2017 Document Reviewed: 05/04/2016  [employer] Interactive Patient Education © 2018 Elsevier Inc.

## 2018-09-16 NOTE — PROGRESS NOTES
"Isabela Pisano is a 43 y.o. male.     Rash   This is a new problem. The current episode started yesterday. The problem has been gradually worsening since onset. The affected locations include the right ankle, left ankle, left foot, left lower leg, right lower leg and right upper leg. The rash is characterized by blistering, redness and swelling. Associated with: was hiking yesterday and came home covered with small \"bugs\" reported to be the size of a grain of pepper and had to be scrubbed off.  this morning right ankle swollen with red blistered areas. Pertinent negatives include no cough, diarrhea, fatigue, fever or rhinorrhea. Past treatments include nothing. The treatment provided no relief.        The following portions of the patient's history were reviewed and updated as appropriate: allergies, current medications, past family history, past medical history, past social history, past surgical history and problem list.    Review of Systems   Constitutional: Negative for fatigue and fever.   HENT: Negative for rhinorrhea.    Respiratory: Negative for cough.    Gastrointestinal: Negative for diarrhea.   Genitourinary: Negative.    Musculoskeletal: Negative.    Skin: Positive for rash.   Neurological: Negative.        Objective   Physical Exam   Constitutional: He is cooperative. No distress.   HENT:   Head: Normocephalic.   Right Ear: Hearing, tympanic membrane, external ear and ear canal normal.   Left Ear: Hearing, tympanic membrane, external ear and ear canal normal.   Nose: Nose normal.   Mouth/Throat: Oropharynx is clear and moist.   Eyes: Pupils are equal, round, and reactive to light. Conjunctivae, EOM and lids are normal.   Neck: Trachea normal and full passive range of motion without pain.   Cardiovascular: Normal rate, regular rhythm and normal pulses.    Pulmonary/Chest: Effort normal and breath sounds normal.   Neurological: He is alert.   Skin: Skin is warm. Capillary refill takes less than " 2 seconds.   Small red, blistered bites diffusely scattered on both legs and worse aorund ankles.  Right ankle with surrounding edema and redness.  Pain when touched, some areas patient reports as itchy, but mostly pain with touching.   Psychiatric: He has a normal mood and affect. His speech is normal and behavior is normal.   Vitals reviewed.        Assessment/Plan   Jalen was seen today for rash.    Diagnoses and all orders for this visit:    Dermatitis    Tick bite, initial encounter    Other orders  -     triamcinolone (KENALOG) 0.1 % ointment; Apply  topically to the appropriate area as directed 3 (Three) Times a Day.  -     PredniSONE 5 MG tablet therapy pack dosepak; Take as directed on package  -     doxycycline (VIBRAMYCIN) 100 MG capsule; Take 1 capsule by mouth 2 (Two) Times a Day for 10 days.        Instructed patient if no improvement as expected or any worsening symptoms he would need to follow up with a higher level of care

## 2019-06-27 ENCOUNTER — OFFICE VISIT (OUTPATIENT)
Dept: FAMILY MEDICINE CLINIC | Facility: CLINIC | Age: 44
End: 2019-06-27

## 2019-06-27 VITALS
HEART RATE: 62 BPM | WEIGHT: 227 LBS | RESPIRATION RATE: 18 BRPM | HEIGHT: 67 IN | BODY MASS INDEX: 35.63 KG/M2 | TEMPERATURE: 99 F | SYSTOLIC BLOOD PRESSURE: 112 MMHG | DIASTOLIC BLOOD PRESSURE: 64 MMHG | OXYGEN SATURATION: 98 %

## 2019-06-27 DIAGNOSIS — Z00.01 ENCOUNTER FOR PREVENTATIVE ADULT HEALTH CARE EXAM WITH ABNORMAL FINDINGS: Primary | ICD-10-CM

## 2019-06-27 LAB
ALBUMIN SERPL-MCNC: 4.4 G/DL (ref 3.5–5.2)
ALBUMIN/GLOB SERPL: 1.5 G/DL
ALP SERPL-CCNC: 84 U/L (ref 39–117)
ALT SERPL W P-5'-P-CCNC: 26 U/L (ref 1–41)
ANION GAP SERPL CALCULATED.3IONS-SCNC: 11 MMOL/L (ref 5–15)
AST SERPL-CCNC: 19 U/L (ref 1–40)
BACTERIA UR QL AUTO: NORMAL /HPF
BILIRUB SERPL-MCNC: 0.8 MG/DL (ref 0.2–1.2)
BILIRUB UR QL STRIP: NEGATIVE
BUN BLD-MCNC: 14 MG/DL (ref 6–20)
BUN/CREAT SERPL: 12.5 (ref 7–25)
CALCIUM SPEC-SCNC: 9.7 MG/DL (ref 8.6–10.5)
CHLORIDE SERPL-SCNC: 103 MMOL/L (ref 98–107)
CHOLEST SERPL-MCNC: 210 MG/DL (ref 0–200)
CLARITY UR: CLEAR
CO2 SERPL-SCNC: 26 MMOL/L (ref 22–29)
COLOR UR: YELLOW
CREAT BLD-MCNC: 1.12 MG/DL (ref 0.76–1.27)
DEVELOPER EXPIRATION DATE: NORMAL
DEVELOPER LOT NUMBER: NORMAL
ERYTHROCYTE [DISTWIDTH] IN BLOOD BY AUTOMATED COUNT: 12.9 % (ref 12.3–15.4)
EXPIRATION DATE: NORMAL
FECAL OCCULT BLOOD SCREEN, POC: NEGATIVE
GFR SERPL CREATININE-BSD FRML MDRD: 71 ML/MIN/1.73
GLOBULIN UR ELPH-MCNC: 3 GM/DL
GLUCOSE BLD-MCNC: 99 MG/DL (ref 65–99)
GLUCOSE UR STRIP-MCNC: NEGATIVE MG/DL
HCT VFR BLD AUTO: 47.4 % (ref 37.5–51)
HDLC SERPL-MCNC: 38 MG/DL (ref 40–60)
HGB BLD-MCNC: 15.8 G/DL (ref 13–17.7)
HGB UR QL STRIP.AUTO: NEGATIVE
KETONES UR QL STRIP: NEGATIVE
LDLC SERPL CALC-MCNC: 155 MG/DL (ref 0–100)
LDLC/HDLC SERPL: 4.08 {RATIO}
LEUKOCYTE ESTERASE UR QL STRIP.AUTO: NEGATIVE
LYMPHOCYTES # BLD AUTO: 1.8 10*3/MM3 (ref 0.7–3.1)
LYMPHOCYTES NFR BLD AUTO: 38.8 % (ref 19.6–45.3)
Lab: NORMAL
MCH RBC QN AUTO: 29.4 PG (ref 26.6–33)
MCHC RBC AUTO-ENTMCNC: 33.2 G/DL (ref 31.5–35.7)
MCV RBC AUTO: 88.3 FL (ref 79–97)
MONOCYTES # BLD AUTO: 0.4 10*3/MM3 (ref 0.1–0.9)
MONOCYTES NFR BLD AUTO: 9.4 % (ref 5–12)
NEGATIVE CONTROL: NEGATIVE
NEUTROPHILS # BLD AUTO: 2.4 10*3/MM3 (ref 1.7–7)
NEUTROPHILS NFR BLD AUTO: 51.8 % (ref 42.7–76)
NITRITE UR QL STRIP: NEGATIVE
PH UR STRIP.AUTO: 5.5 [PH] (ref 4.6–8)
PLATELET # BLD AUTO: 196 10*3/MM3 (ref 140–450)
PMV BLD AUTO: 7.7 FL (ref 6–12)
POSITIVE CONTROL: POSITIVE
POTASSIUM BLD-SCNC: 4.2 MMOL/L (ref 3.5–5.2)
PROT SERPL-MCNC: 7.4 G/DL (ref 6–8.5)
PROT UR QL STRIP: NEGATIVE
PSA SERPL-MCNC: 1.02 NG/ML (ref 0–4)
RBC # BLD AUTO: 5.37 10*6/MM3 (ref 4.14–5.8)
RBC # UR: NORMAL /HPF
REF LAB TEST METHOD: NORMAL
SODIUM BLD-SCNC: 140 MMOL/L (ref 136–145)
SP GR UR STRIP: 1.02 (ref 1–1.03)
SQUAMOUS #/AREA URNS HPF: NORMAL /HPF
T-UPTAKE NFR SERPL: 1.17 TBI (ref 0.8–1.3)
T4 SERPL-MCNC: 6.99 MCG/DL (ref 4.5–11.7)
TRIGL SERPL-MCNC: 84 MG/DL (ref 0–150)
TSH SERPL DL<=0.05 MIU/L-ACNC: 1.42 MIU/ML (ref 0.27–4.2)
UROBILINOGEN UR QL STRIP: NORMAL
VLDLC SERPL-MCNC: 16.8 MG/DL (ref 5–40)
WBC NRBC COR # BLD: 4.7 10*3/MM3 (ref 3.4–10.8)
WBC UR QL AUTO: NORMAL /HPF

## 2019-06-27 PROCEDURE — 82270 OCCULT BLOOD FECES: CPT | Performed by: FAMILY MEDICINE

## 2019-06-27 PROCEDURE — 84479 ASSAY OF THYROID (T3 OR T4): CPT | Performed by: FAMILY MEDICINE

## 2019-06-27 PROCEDURE — 84443 ASSAY THYROID STIM HORMONE: CPT | Performed by: FAMILY MEDICINE

## 2019-06-27 PROCEDURE — 84436 ASSAY OF TOTAL THYROXINE: CPT | Performed by: FAMILY MEDICINE

## 2019-06-27 PROCEDURE — 36415 COLL VENOUS BLD VENIPUNCTURE: CPT | Performed by: FAMILY MEDICINE

## 2019-06-27 PROCEDURE — 80061 LIPID PANEL: CPT | Performed by: FAMILY MEDICINE

## 2019-06-27 PROCEDURE — 71046 X-RAY EXAM CHEST 2 VIEWS: CPT | Performed by: FAMILY MEDICINE

## 2019-06-27 PROCEDURE — 80053 COMPREHEN METABOLIC PANEL: CPT | Performed by: FAMILY MEDICINE

## 2019-06-27 PROCEDURE — 85025 COMPLETE CBC W/AUTO DIFF WBC: CPT | Performed by: FAMILY MEDICINE

## 2019-06-27 PROCEDURE — 93000 ELECTROCARDIOGRAM COMPLETE: CPT | Performed by: FAMILY MEDICINE

## 2019-06-27 PROCEDURE — 81001 URINALYSIS AUTO W/SCOPE: CPT | Performed by: FAMILY MEDICINE

## 2019-06-27 PROCEDURE — G0103 PSA SCREENING: HCPCS | Performed by: FAMILY MEDICINE

## 2019-06-27 PROCEDURE — 99396 PREV VISIT EST AGE 40-64: CPT | Performed by: FAMILY MEDICINE

## 2019-06-27 RX ORDER — PENCICLOVIR 10 MG/G
CREAM TOPICAL
Qty: 1 EACH | Refills: 5 | Status: SHIPPED | OUTPATIENT
Start: 2019-06-27

## 2019-06-27 NOTE — PROGRESS NOTES
SUBJECTIVE:   Jalen Pisano is a 44 y.o., male, who presents for a complete physical examination.    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  ENT:  Negative for earache, ST, toothache, rhinorrhea, hoarseness, or PND.  Musculoskeletal:  Negative for neck pain.  No heat, myalgia, redness or joint pain.  He had shoulder surgery last year.  He has noticed a lump in the area of the distal clavicle.  This does not cause tenderness or discomfort.  Cardiovascular:  Negative for orthopnea, chest pain, LE edema, palpitations, or rapid heart rate.    Skin:  Negative for bruising, swelling, rash, abrasions, or itching.    Respiratory:  Negatie for pleuritic chest pain, shortness of air, nonproductive/productive cough, hemoptysis, or PRICE.    Neurologic:  Negative for history of weakness, numbness, paresthesia, loss of consciousness, speech change, or ataxia.    Gastrointestinal:  Negative for melena, hematochezia, nausea, vomiting, change in bowel habits.    Genitourinary:  Negative for flank pain, dysuria, frequency, or hematuria.    Psychiatric:  Negative for agitation, suicidal ideation, change in mental status, depression, confusion, or insomnia.  Hematologic:  Negative for nodes, bruising, bleeding, or petechiae.  Immunologic:  Negative for atopic dermatitis, sneezing, rhinorrhea, or hives.  Integument: No suspicious lesions. Skin turgor good.  He complains of a mole on his right forearm of which she has concern.    ALLERGIES: None known    OBJECTIVE:    Vital Signs: Reviewed and stable  Constitutional:  Alert and oriented x3, in no acute distress.  Eyes:  Sclerae white, conjunctivae clear.  Lids are  without lag.  PERRLA. Discs sharp.    Ears:  No scars, lesions or masses.  Tympanic membranes translucent, nonbulging, and mobile. Canal walls are pink.  Septum is midline.    Mouth:  Lips are pink and symmetrical.  Gums are pink.  Good dentition.    Throat:  Oral mucosa pink and moist.  Salivery glands intact.  Soft and hard palates contiguous.  Tongue moist without ulcers.  Gag reflex present.    Neck:  Full range of motion.  Trachea midline position.  No thyromegaly.   Respiratory:  Respirations are even and unlabored.  Lung fields with no flatness, dullness, or hyperresonance.  Clear and equal breath sounds with no adventitious sounds bilaterally.    Cardiovascular:  No lifts, heaves, or thrills.  PMI present.  S1 and S2 not exaggerated or diminished.  Regular rate and rhythm, without murmurs, rubs or gallops.  Normal carotids.  Pedal pulses are within normal limits bilaterally.  No edema.  No varicosities.    Chest:  Equal bilateral expansion.  Abdomen:  No masses or tenderness.  Bowel sounds active x4 quadrants.  Liver and spleen are without tenderness or enlargement.  No hernias. No organomegaly.   Digital Rectal Exam:  No masses.  Hemoccult negative.  Prostate soft nontender symmetrical.  Genitalia: Normal male.  No scrotal masses or tenderness.  Lymphatic:  Areas palpated are not enlarged.    Extremities:  Full range of motion. No cyanosis, clubbing, or edema.  Musculoskeletal:  Gait coordinated and smooth.    Skin:  No rashes,  or ulcers.  No discoloration.  Warm and dry.  Normal turgor.  Raised 5 mm lesion dorsal surface right forearm.  Neurologic:  Cranial nerves are intact.  Deep tendon reflexes are 2+ bilaterally.  Superficial touch and pain sensation intact bilaterally.  Psychiatric:  Judgement and insight are normal.  Orientation to time, place and person.  Normal mood and affect.  Memory intact.        ECG 12 Lead  Date/Time: 6/27/2019 7:50 AM  Performed by: Jassi Ayala MD  Authorized by: Jamie  Jassi GARCIA MD   Rhythm: sinus bradycardia  Conduction: incomplete right bundle branch block        EKG is done here and interpreted by me.  Indication complete physical.  EKG for comparison-February 2018.  EKG shows sinus bradycardia incomplete right bundle branch block and is unchanged.  Chest x-ray is done here and interpreted by me.  Indication complete physical.  Chest x-ray for comparison July 2017.  Chest x-ray is within normal limits and is unchanged.    LABORATORY: CBC CMP PSA TSH thyroid profile urinalysis fasting lipids ordered.    ASSESSMENT:  Complete physical examination.      PL.AN: Healthy lifestyle discussed.  Follow-up on labs.  Refer to dermatology.  He will contact his orthopedic surgeon regarding his right shoulder.

## 2019-08-20 ENCOUNTER — OFFICE VISIT (OUTPATIENT)
Dept: ORTHOPEDIC SURGERY | Facility: CLINIC | Age: 44
End: 2019-08-20

## 2019-08-20 VITALS
BODY MASS INDEX: 35.31 KG/M2 | DIASTOLIC BLOOD PRESSURE: 90 MMHG | WEIGHT: 225 LBS | HEART RATE: 66 BPM | SYSTOLIC BLOOD PRESSURE: 132 MMHG | HEIGHT: 67 IN

## 2019-08-20 DIAGNOSIS — M75.81 TENDINITIS OF RIGHT ROTATOR CUFF: ICD-10-CM

## 2019-08-20 DIAGNOSIS — Z98.890 STATUS POST ARTHROSCOPY OF SHOULDER: ICD-10-CM

## 2019-08-20 DIAGNOSIS — R52 PAIN: Primary | ICD-10-CM

## 2019-08-20 DIAGNOSIS — M19.019 AC JOINT ARTHROPATHY: ICD-10-CM

## 2019-08-20 PROCEDURE — 73030 X-RAY EXAM OF SHOULDER: CPT | Performed by: ORTHOPAEDIC SURGERY

## 2019-08-20 PROCEDURE — 99213 OFFICE O/P EST LOW 20 MIN: CPT | Performed by: ORTHOPAEDIC SURGERY

## 2019-08-20 NOTE — PROGRESS NOTES
Subjective:     Patient ID: Jalen Pisano is a 44 y.o. male.    Chief Complaint:  F/u s/p right shoulder extensive debridement and open distal clavicle excision  DOS 2/26/2018    History of Present Illness  Jalen Pisano presents to clinic today for evaluation of right shoulder. He notes pain in his right shoulder that has worsened over the past 6-8 months. At its worst, he rates his pain as a 5/10 in severity. His pain is localized to the superior aspect.  His pain is improved with tylenol and worsened with sleeping on his right side. He also notes an area of swelling on the right shoulder. Denies radiation of pain, denies associated numbness or tingling.    Social History     Occupational History   • Not on file   Tobacco Use   • Smoking status: Never Smoker   • Smokeless tobacco: Never Used   Substance and Sexual Activity   • Alcohol use: Yes     Comment: occas   • Drug use: No   • Sexual activity: Defer      Past Medical History:   Diagnosis Date   • Injury of right shoulder     sched surgery   • Neck pain, chronic    • PONV (postoperative nausea and vomiting)      Past Surgical History:   Procedure Laterality Date   • EPIDURAL BLOCK      cervical   • KNEE MENISCECTOMY Left 1996   • SHOULDER ARTHROSCOPY Right 2/26/2018    Procedure: SHOULDER ARTHROSCOPY,extensive debridement  open distal clavicle excision;  Surgeon: Darian Burgess MD;  Location: [facility];  Service:        Family History   Problem Relation Age of Onset   • Diabetes Father    • Hypertension Father          Review of Systems   Constitutional: Negative for chills, diaphoresis, fever and unexpected weight change.   HENT: Negative for hearing loss, nosebleeds, sore throat and tinnitus.    Eyes: Negative for pain and visual disturbance.   Respiratory: Negative for cough, shortness of breath and wheezing.    Cardiovascular: Negative for chest pain and palpitations.   Gastrointestinal: Negative for abdominal pain, diarrhea, nausea and vomiting.  "  Endocrine: Negative for cold intolerance, heat intolerance and polydipsia.   Genitourinary: Negative for difficulty urinating, dysuria and hematuria.   Musculoskeletal: Positive for arthralgias and myalgias. Negative for joint swelling.   Skin: Negative for rash and wound.   Allergic/Immunologic: Negative for environmental allergies.   Neurological: Negative for dizziness, syncope and numbness.   Hematological: Does not bruise/bleed easily.   Psychiatric/Behavioral: Negative for dysphoric mood and sleep disturbance. The patient is not nervous/anxious.            Objective:  Vitals:    08/20/19 1510   BP: 132/90   BP Location: Left arm   Pulse: 66   Weight: 102 kg (225 lb)   Height: 170.2 cm (67\")         08/20/19  1510   Weight: 102 kg (225 lb)     Body mass index is 35.24 kg/m².    General: No acute distress.  Resp: normal respiratory effort  Skin: no rashes or wounds; normal turgor  Psych: mood and affect appropriate; recent and remote memory intact      Ortho Exam       Right Shoulder-    FF-   Active- 180    Strength- 5/5  ER-      Active- 65   Strength- 5/5  IR        To T8    Strength- 5/5  Bear hug sign-negative    Neer's sign- negative  Villa- negative    Cross arm test- mildly positive  Raised region at the site of prior distal clavicle excision medial to incision which is well-healed, minimally tender to palpation and slightly mobile, firm    Brisk cap refill to all digits, 2+radial pulse    Positive sensation to light touch palmar, dorsal aspects of small and index fingers and anatomic snuffbox right hand    Imaging:  Right Shoulder X-Ray  Indication: Pain  AP, scapular Y, and axillary lateral views    Findings:  Small region of cortical fragmentation of the superior aspect of the distal clavicle with widened space at AC joint consistent with prior distal clavicle excision.  Minimal humeral head elevation with minimal osteophyte formation inferior glenoid noted.  No evidence of fracture, dislocation, " or subluxation.  Compared to prior preoperative x-rays from office.    Assessment:        1. Pain    2. Status post arthroscopy of shoulder    3. AC joint arthropathy    4. Tendinitis of right rotator cuff           Plan:          1. Discussed treatment options at length with patient at today's visit.   2. Perform soft tissue massage over right shoulder.  3. Advised patient to continue at-home exercise program for improvement in strength, range of motion and function with daily activities.  4. If pain continues to increase or gets more local pressure and irritation from the region of reactive bone formation that is noted at site of prior distal clavicle excision, I discussed with him that we could consider local injection to the region or reexcision of this reactive bone.  He is not interested in at this time.  He wanted reassurance that it was not going to cause any further issues to any tendon structures.      Jalen Pisano was in agreement with plan and had all questions answered.     Orders:  Orders Placed This Encounter   Procedures   • XR Shoulder 2+ View Right       Medications:  No orders of the defined types were placed in this encounter.      Followup:  Return if symptoms worsen or fail to improve.    Jalen was seen today for follow-up and pain.    Diagnoses and all orders for this visit:    Pain  -     XR Shoulder 2+ View Right    Status post arthroscopy of shoulder    AC joint arthropathy    Tendinitis of right rotator cuff      By signing my name here, I Nori Machado, attest that all documentation on 08/21/19 at 10:13 AM has been prepared under the direction and in the presence of Dr. Darian Burgess.    I, Dr. Darian Burgess, personally performed the services described in this documentation, as scribed by Nori Machado, in my presence, and it is both accurate and complete.      Dictated utilizing Dragon dictation

## 2020-08-31 ENCOUNTER — HOSPITAL ENCOUNTER (EMERGENCY)
Facility: HOSPITAL | Age: 45
Discharge: HOME | End: 2020-08-31
Attending: EMERGENCY MEDICINE | Admitting: EMERGENCY MEDICINE

## 2020-08-31 ENCOUNTER — APPOINTMENT (OUTPATIENT)
Dept: CARDIOLOGY | Facility: HOSPITAL | Age: 45
End: 2020-08-31

## 2020-08-31 VITALS
WEIGHT: 240 LBS | DIASTOLIC BLOOD PRESSURE: 90 MMHG | TEMPERATURE: 98.7 F | OXYGEN SATURATION: 100 % | HEART RATE: 70 BPM | BODY MASS INDEX: 36.37 KG/M2 | SYSTOLIC BLOOD PRESSURE: 133 MMHG | RESPIRATION RATE: 16 BRPM | HEIGHT: 68 IN

## 2020-08-31 DIAGNOSIS — M79.605 LEG PAIN, LEFT: Primary | ICD-10-CM

## 2020-08-31 DIAGNOSIS — L03.116 CELLULITIS OF LEFT LEG: ICD-10-CM

## 2020-08-31 LAB
ALBUMIN SERPL-MCNC: 3.9 G/DL (ref 3.5–5.2)
ALBUMIN/GLOB SERPL: 1.2 G/DL
ALP SERPL-CCNC: 69 U/L (ref 39–117)
ALT SERPL W P-5'-P-CCNC: 50 U/L (ref 1–41)
ANION GAP SERPL CALCULATED.3IONS-SCNC: 9.9 MMOL/L (ref 5–15)
AST SERPL-CCNC: 27 U/L (ref 1–40)
BASOPHILS # BLD AUTO: 0.05 10*3/MM3 (ref 0–0.2)
BASOPHILS NFR BLD AUTO: 0.7 % (ref 0–1.5)
BH CV LOWER VASCULAR LEFT COMMON FEMORAL AUGMENT: NORMAL
BH CV LOWER VASCULAR LEFT COMMON FEMORAL COMPETENT: NORMAL
BH CV LOWER VASCULAR LEFT COMMON FEMORAL COMPRESS: NORMAL
BH CV LOWER VASCULAR LEFT COMMON FEMORAL PHASIC: NORMAL
BH CV LOWER VASCULAR LEFT COMMON FEMORAL SPONT: NORMAL
BH CV LOWER VASCULAR LEFT DISTAL FEMORAL COMPRESS: NORMAL
BH CV LOWER VASCULAR LEFT GASTRONEMIUS COMPRESS: NORMAL
BH CV LOWER VASCULAR LEFT GREATER SAPH AK COMPRESS: NORMAL
BH CV LOWER VASCULAR LEFT GREATER SAPH BK COMPRESS: NORMAL
BH CV LOWER VASCULAR LEFT LESSER SAPH COMPRESS: NORMAL
BH CV LOWER VASCULAR LEFT MID FEMORAL AUGMENT: NORMAL
BH CV LOWER VASCULAR LEFT MID FEMORAL COMPETENT: NORMAL
BH CV LOWER VASCULAR LEFT MID FEMORAL COMPRESS: NORMAL
BH CV LOWER VASCULAR LEFT MID FEMORAL PHASIC: NORMAL
BH CV LOWER VASCULAR LEFT MID FEMORAL SPONT: NORMAL
BH CV LOWER VASCULAR LEFT PERONEAL COMPRESS: NORMAL
BH CV LOWER VASCULAR LEFT POPLITEAL AUGMENT: NORMAL
BH CV LOWER VASCULAR LEFT POPLITEAL COMPETENT: NORMAL
BH CV LOWER VASCULAR LEFT POPLITEAL COMPRESS: NORMAL
BH CV LOWER VASCULAR LEFT POPLITEAL PHASIC: NORMAL
BH CV LOWER VASCULAR LEFT POPLITEAL SPONT: NORMAL
BH CV LOWER VASCULAR LEFT POSTERIOR TIBIAL COMPRESS: NORMAL
BH CV LOWER VASCULAR LEFT PROFUNDA FEMORAL COMPRESS: NORMAL
BH CV LOWER VASCULAR LEFT PROXIMAL FEMORAL COMPRESS: NORMAL
BH CV LOWER VASCULAR LEFT SAPHENOFEMORAL JUNCTION COMPRESS: NORMAL
BH CV LOWER VASCULAR RIGHT COMMON FEMORAL AUGMENT: NORMAL
BH CV LOWER VASCULAR RIGHT COMMON FEMORAL COMPETENT: NORMAL
BH CV LOWER VASCULAR RIGHT COMMON FEMORAL COMPRESS: NORMAL
BH CV LOWER VASCULAR RIGHT COMMON FEMORAL PHASIC: NORMAL
BH CV LOWER VASCULAR RIGHT COMMON FEMORAL SPONT: NORMAL
BILIRUB SERPL-MCNC: 0.4 MG/DL (ref 0–1.2)
BUN SERPL-MCNC: 14 MG/DL (ref 6–20)
BUN/CREAT SERPL: 12.6 (ref 7–25)
CALCIUM SPEC-SCNC: 9.6 MG/DL (ref 8.6–10.5)
CHLORIDE SERPL-SCNC: 105 MMOL/L (ref 98–107)
CO2 SERPL-SCNC: 22.1 MMOL/L (ref 22–29)
CREAT SERPL-MCNC: 1.11 MG/DL (ref 0.76–1.27)
DEPRECATED RDW RBC AUTO: 44.8 FL (ref 37–54)
EOSINOPHIL # BLD AUTO: 0.05 10*3/MM3 (ref 0–0.4)
EOSINOPHIL NFR BLD AUTO: 0.7 % (ref 0.3–6.2)
ERYTHROCYTE [DISTWIDTH] IN BLOOD BY AUTOMATED COUNT: 13.1 % (ref 12.3–15.4)
GFR SERPL CREATININE-BSD FRML MDRD: 71 ML/MIN/1.73
GLOBULIN UR ELPH-MCNC: 3.3 GM/DL
GLUCOSE SERPL-MCNC: 115 MG/DL (ref 65–99)
HCT VFR BLD AUTO: 43.7 % (ref 37.5–51)
HGB BLD-MCNC: 14.1 G/DL (ref 13–17.7)
HOLD SPECIMEN: NORMAL
HOLD SPECIMEN: NORMAL
IMM GRANULOCYTES # BLD AUTO: 0.03 10*3/MM3 (ref 0–0.05)
IMM GRANULOCYTES NFR BLD AUTO: 0.4 % (ref 0–0.5)
LYMPHOCYTES # BLD AUTO: 1.38 10*3/MM3 (ref 0.7–3.1)
LYMPHOCYTES NFR BLD AUTO: 20.3 % (ref 19.6–45.3)
MCH RBC QN AUTO: 30 PG (ref 26.6–33)
MCHC RBC AUTO-ENTMCNC: 32.3 G/DL (ref 31.5–35.7)
MCV RBC AUTO: 93 FL (ref 79–97)
MONOCYTES # BLD AUTO: 0.61 10*3/MM3 (ref 0.1–0.9)
MONOCYTES NFR BLD AUTO: 9 % (ref 5–12)
NEUTROPHILS NFR BLD AUTO: 4.69 10*3/MM3 (ref 1.7–7)
NEUTROPHILS NFR BLD AUTO: 68.9 % (ref 42.7–76)
NRBC BLD AUTO-RTO: 0 /100 WBC (ref 0–0.2)
PLATELET # BLD AUTO: 189 10*3/MM3 (ref 140–450)
PMV BLD AUTO: 10.5 FL (ref 6–12)
POTASSIUM SERPL-SCNC: 3.6 MMOL/L (ref 3.5–5.2)
PROT SERPL-MCNC: 7.2 G/DL (ref 6–8.5)
RBC # BLD AUTO: 4.7 10*6/MM3 (ref 4.14–5.8)
SODIUM SERPL-SCNC: 137 MMOL/L (ref 136–145)
WBC # BLD AUTO: 6.81 10*3/MM3 (ref 3.4–10.8)
WHOLE BLOOD HOLD SPECIMEN: NORMAL
WHOLE BLOOD HOLD SPECIMEN: NORMAL

## 2020-08-31 PROCEDURE — 85025 COMPLETE CBC W/AUTO DIFF WBC: CPT | Performed by: NURSE PRACTITIONER

## 2020-08-31 PROCEDURE — 93971 EXTREMITY STUDY: CPT

## 2020-08-31 PROCEDURE — 80053 COMPREHEN METABOLIC PANEL: CPT | Performed by: NURSE PRACTITIONER

## 2020-08-31 PROCEDURE — 36415 COLL VENOUS BLD VENIPUNCTURE: CPT

## 2020-08-31 PROCEDURE — 99283 EMERGENCY DEPT VISIT LOW MDM: CPT

## 2020-08-31 RX ORDER — DOXYCYCLINE HYCLATE 100 MG/1
100 TABLET, DELAYED RELEASE ORAL 2 TIMES DAILY
Qty: 20 TABLET | Refills: 0 | Status: SHIPPED | OUTPATIENT
Start: 2020-08-31

## 2020-09-01 NOTE — DISCHARGE INSTRUCTIONS
Home to rest  Drink plenty of fluids  Take antibiotics as directed   Return if worse or new concerns   Follow up with your doctor.   Continue care with your primary care physician and have your blood pressure regularly checked and managed. Normal blood pressure is 120/80.

## 2020-09-01 NOTE — ED PROVIDER NOTES
Pt presents to the ED c/o  rash to the left lower extremity with some increased heat and a slight burning sensation.  Patient states this started within the last 24 hours.  No recent falls or injuries, has been hiking and has had some bug bites in the area.  No other rash elsewhere.  No fevers, chills, vomiting, diarrhea, body aches.     On exam,   General: Awake, alert, no acute distress  HEENT: EOMI  Pulm: Symmetric chest rise, nonlabored breathing  CV: Regular rate and rhythm  GI: Non-distended  MSK: No deformity  Skin: Erythematous and slightly petechial appearing rash that is circumferential of the distal to mid left shin area with some slightly increased heat but no significant tenderness.  Some very faint mild edema.  No other rashes noted.  Neuro: Alert and oriented x 3, moving all extremities, no focal deficits  Psych: Calm, cooperative    Vital signs and nursing notes reviewed.       Surgical mask, protective eye goggles, and gloves used during this encounter. Patient in surgical mask.      Plan:   ED Course as of Sep 02 1551   Mon Aug 31, 2020   2133 Platelets: 189 [JS]   2133 WBC: 6.81 [JS]      ED Course User Index  [JS] Jacklyn Doshi APRN     Labs are unremarkable, at this point will treat as if this is a mild cellulitis, slightly atypical appearance to the rash will have him follow-up with his primary care provider for recheck and potential further blood work if needed this week.     Attestation:  The JIN and I have discussed this patient's history, physical exam, and treatment plan.  I have reviewed the documentation and personally had a face to face interaction with the patient. I affirm the documentation and agree with the treatment and plan.  The attached note describes my personal findings.            Be Pop MD  08/31/20 8080       Be Pop MD  09/02/20 7205

## 2020-09-01 NOTE — ED PROVIDER NOTES
EMERGENCY DEPARTMENT ENCOUNTER    Room Number:  40/40  Date of encounter:  9/1/2020  PCP: Jassi Ayala MD  Historian: patient   Full history not obtainable due to: None     HPI:  Chief Complaint: Left leg pain    Context: Jalen Pisano is a 45 y.o. male who presents to the ED c/o left lower leg pain onset this am. Pain is constant, mild and itching and burning in nature. Non radiating. Not tried anything to help with the pain. No fever. Yesterday he was working in the sun for several hours and became light headed and states he felt feverish afterward although he did not take his temperature to confirm. No chills. No nv.   Did go hiking a few weeks ago and has several bug bites. No tick bites.      Non smoker.     PAST MEDICAL HISTORY    Active Ambulatory Problems     Diagnosis Date Noted   • Subacromial impingement of right shoulder 11/14/2017   • Tendinitis of right rotator cuff 11/14/2017   • AC joint arthropathy 11/14/2017   • Status post arthroscopy of shoulder 02/27/2018     Resolved Ambulatory Problems     Diagnosis Date Noted   • No Resolved Ambulatory Problems     Past Medical History:   Diagnosis Date   • Injury of right shoulder    • Neck pain, chronic    • PONV (postoperative nausea and vomiting)          PAST SURGICAL HISTORY  Past Surgical History:   Procedure Laterality Date   • EPIDURAL BLOCK      cervical   • KNEE MENISCECTOMY Left 1996   • SHOULDER ARTHROSCOPY Right 2/26/2018    Procedure: SHOULDER ARTHROSCOPY,extensive debridement  open distal clavicle excision;  Surgeon: Darian Burgess MD;  Location: [facility];  Service:          FAMILY HISTORY  Family History   Problem Relation Age of Onset   • Diabetes Father    • Hypertension Father          SOCIAL HISTORY  Social History     Socioeconomic History   • Marital status: Single     Spouse name: Not on file   • Number of children: Not on file   • Years of education: Not on file   • Highest education level: Not on file   Tobacco Use   • Smoking  status: Never Smoker   • Smokeless tobacco: Never Used   Substance and Sexual Activity   • Alcohol use: Yes     Comment: occas   • Drug use: No   • Sexual activity: Defer         ALLERGIES  Patient has no known allergies.        REVIEW OF SYSTEMS  Review of Systems   All systems reviewed and marked as negative except as listed in HPI       PHYSICAL EXAM    I have reviewed the triage vital signs and nursing notes.    ED Triage Vitals   Temp Heart Rate Resp BP SpO2   08/31/20 1735 08/31/20 1735 08/31/20 1954 08/31/20 1954 08/31/20 1735   98.7 °F (37.1 °C) 85 16 150/89 98 %      Temp src Heart Rate Source Patient Position BP Location FiO2 (%)   08/31/20 1735 08/31/20 1954 -- -- --   Tympanic Monitor          GENERAL: alert well developed, well nourished in no distress  HENT: NCAT, neck supple, trachea midline  EYES: no scleral icterus, PERRL, normal conjunctiva  CV: regular rhythm, regular rate, no murmur  RESPIRATORY: unlabored effort, CTAB  ABDOMEN: soft, non-tender, non-distended, bowel sounds present  MUSCULOSKELETAL: no gross deformity  NEURO: alert,  sensory and motor function of extremities grossly intact, speech clear, mental status normal/baseline  SKIN: warm, dry, localized erythemic and petichial rash to the mid to distal LLE. Mostly distributed on the anterior surface but does partially extend on the posterior LLE. Approx 43avy56 cm. No lymphangitis. Pedal pulses palpable. Few scattered papular lesions across BLE ,most with secondary crusting noted.   PSYCH:  Appropriate mood and affect    Vital signs and nursing notes reviewed.          LAB RESULTS  Recent Results (from the past 24 hour(s))   Light Blue Top    Collection Time: 08/31/20  5:58 PM   Result Value Ref Range    Extra Tube hold for add-on    Green Top (Gel)    Collection Time: 08/31/20  5:58 PM   Result Value Ref Range    Extra Tube Hold for add-ons.    Lavender Top    Collection Time: 08/31/20  5:58 PM   Result Value Ref Range    Extra Tube hold  for add-on    Gold Top - SST    Collection Time: 08/31/20  5:58 PM   Result Value Ref Range    Extra Tube Hold for add-ons.    Comprehensive Metabolic Panel    Collection Time: 08/31/20  5:58 PM   Result Value Ref Range    Glucose 115 (H) 65 - 99 mg/dL    BUN 14 6 - 20 mg/dL    Creatinine 1.11 0.76 - 1.27 mg/dL    Sodium 137 136 - 145 mmol/L    Potassium 3.6 3.5 - 5.2 mmol/L    Chloride 105 98 - 107 mmol/L    CO2 22.1 22.0 - 29.0 mmol/L    Calcium 9.6 8.6 - 10.5 mg/dL    Total Protein 7.2 6.0 - 8.5 g/dL    Albumin 3.90 3.50 - 5.20 g/dL    ALT (SGPT) 50 (H) 1 - 41 U/L    AST (SGOT) 27 1 - 40 U/L    Alkaline Phosphatase 69 39 - 117 U/L    Total Bilirubin 0.4 0.0 - 1.2 mg/dL    eGFR Non African Amer 71 >60 mL/min/1.73    Globulin 3.3 gm/dL    A/G Ratio 1.2 g/dL    BUN/Creatinine Ratio 12.6 7.0 - 25.0    Anion Gap 9.9 5.0 - 15.0 mmol/L   CBC Auto Differential    Collection Time: 08/31/20  5:58 PM   Result Value Ref Range    WBC 6.81 3.40 - 10.80 10*3/mm3    RBC 4.70 4.14 - 5.80 10*6/mm3    Hemoglobin 14.1 13.0 - 17.7 g/dL    Hematocrit 43.7 37.5 - 51.0 %    MCV 93.0 79.0 - 97.0 fL    MCH 30.0 26.6 - 33.0 pg    MCHC 32.3 31.5 - 35.7 g/dL    RDW 13.1 12.3 - 15.4 %    RDW-SD 44.8 37.0 - 54.0 fl    MPV 10.5 6.0 - 12.0 fL    Platelets 189 140 - 450 10*3/mm3    Neutrophil % 68.9 42.7 - 76.0 %    Lymphocyte % 20.3 19.6 - 45.3 %    Monocyte % 9.0 5.0 - 12.0 %    Eosinophil % 0.7 0.3 - 6.2 %    Basophil % 0.7 0.0 - 1.5 %    Immature Grans % 0.4 0.0 - 0.5 %    Neutrophils, Absolute 4.69 1.70 - 7.00 10*3/mm3    Lymphocytes, Absolute 1.38 0.70 - 3.10 10*3/mm3    Monocytes, Absolute 0.61 0.10 - 0.90 10*3/mm3    Eosinophils, Absolute 0.05 0.00 - 0.40 10*3/mm3    Basophils, Absolute 0.05 0.00 - 0.20 10*3/mm3    Immature Grans, Absolute 0.03 0.00 - 0.05 10*3/mm3    nRBC 0.0 0.0 - 0.2 /100 WBC   Duplex Venous Lower Extremity - Left CAR    Collection Time: 08/31/20  6:52 PM   Result Value Ref Range    Right Common Femoral Spont Y      Right Common Femoral Phasic Y     Right Common Femoral Augment Y     Right Common Femoral Competent Y     Right Common Femoral Compress C     Left Common Femoral Spont Y     Left Common Femoral Phasic Y     Left Common Femoral Augment Y     Left Common Femoral Competent Y     Left Common Femoral Compress C     Left Saphenofemoral Junction Compress C     Left Profunda Femoral Compress C     Left Proximal Femoral Compress C     Left Mid Femoral Spont Y     Left Mid Femoral Phasic Y     Left Mid Femoral Augment Y     Left Mid Femoral Competent Y     Left Mid Femoral Compress C     Left Distal Femoral Compress C     Left Popliteal Spont Y     Left Popliteal Phasic Y     Left Popliteal Augment Y     Left Popliteal Competent Y     Left Popliteal Compress C     Left Posterior Tibial Compress C     Left Peroneal Compress C     Left GastronemiusSoleal Compress C     Left Greater Saph AK Compress C     Left Greater Saph BK Compress C     Left Lesser Saph Compress C        Ordered the above labs and independently reviewed the results.      PROCEDURES    Procedures        MEDICATIONS GIVEN IN ER    Medications - No data to display      PROGRESS, DATA ANALYSIS, CONSULTS, AND MEDICAL DECISION MAKING    All labs have been independently reviewed by me.  All radiology studies have been reviewed by me.   EKG's independently reviewed by me.  Discussion below represents my analysis of pertinent findings related to patient's condition, differential diagnosis, treatment plan and final disposition.    DIFFERENTIAL DIAGNOSIS INCLUDE BUT NOT LIMITED TO: Cellulitis, erysipelas, sepsis, lyme disease, ehrlichiosis, insect bite, contact dermatitis, vasculitis, venous stasis, DVT, Superficial venous thrombosis, allergic reaction      ED Course as of Sep 01 0314   Mon Aug 31, 2020   2133 Platelets: 189 [JS]   2133 WBC: 6.81 [JS]      ED Course User Index  [JS] Jacklyn Doshi, RASHAD     MDM: The patient presents with right lower extremity  rash.  Given petechial nature and no significant history of scratching the rash, blood counts were obtained.  His wife expressed concern for underlying DVT, however his ultrasound testing is negative.  Platelet count is normal.  The patient is not anemic.  He reports a subjective fever however it was not confirmed with a thermometer and he has not been febrile here.  There is no lymphangitis.  His white blood cell count is normal.  He did have several insect bites and he will be placed on antibiotics for concern of underlying cellulitis.  My suspicion for tickborne illness is low however he will be covered with doxycycline.  He is stable for discharge at this time.  Plan for follow-up closely with his family doctor this week.    AS OF 03:14 VITALS:        BP - 133/90  HR - 70  TEMP - 98.7 °F (37.1 °C) (Tympanic)  02 SATS - 100%          DIAGNOSIS  Final diagnoses:   Leg pain, left   Cellulitis of left leg         DISPOSITION  Discharge     Pt masked in first look. I wore a surgical mask and protective eye wear throughout my encounters with the pt. I performed hand hygiene on entry into the pt room and upon exit.     Dictated utilizing Dragon dictation:  Much of this encounter note is an electronic transcription/translation of spoken language to printed text. The electronic translation of spoken language may permit erroneous, or at times, nonsensical words or phrases to be inadvertently transcribed; Although I have reviewed the note for such errors, some may still exist.       Jacklyn Doshi, APRN  09/01/20 [id number]

## 2021-03-31 ENCOUNTER — BULK ORDERING (OUTPATIENT)
Dept: CASE MANAGEMENT | Facility: OTHER | Age: 46
End: 2021-03-31

## 2021-03-31 DIAGNOSIS — Z23 IMMUNIZATION DUE: ICD-10-CM

## 2021-05-13 PROCEDURE — 99204 OFFICE O/P NEW MOD 45 MIN: CPT | Performed by: EMERGENCY MEDICINE

## 2021-05-13 PROCEDURE — 72050 X-RAY EXAM NECK SPINE 4/5VWS: CPT | Performed by: EMERGENCY MEDICINE

## 2021-07-22 ENCOUNTER — OFFICE VISIT (OUTPATIENT)
Dept: FAMILY MEDICINE CLINIC | Facility: CLINIC | Age: 46
End: 2021-07-22

## 2021-07-22 VITALS
WEIGHT: 258 LBS | HEART RATE: 84 BPM | RESPIRATION RATE: 20 BRPM | SYSTOLIC BLOOD PRESSURE: 130 MMHG | HEIGHT: 68 IN | DIASTOLIC BLOOD PRESSURE: 78 MMHG | OXYGEN SATURATION: 98 % | TEMPERATURE: 97.8 F | BODY MASS INDEX: 39.1 KG/M2

## 2021-07-22 DIAGNOSIS — M79.642 BILATERAL HAND PAIN: Primary | ICD-10-CM

## 2021-07-22 DIAGNOSIS — L98.9 SKIN LESION: ICD-10-CM

## 2021-07-22 DIAGNOSIS — M79.641 BILATERAL HAND PAIN: Primary | ICD-10-CM

## 2021-07-22 PROCEDURE — 99213 OFFICE O/P EST LOW 20 MIN: CPT | Performed by: NURSE PRACTITIONER

## 2021-07-22 NOTE — PROGRESS NOTES
"Chief Complaint  trigger finger (both hands) and check place on thumb    Subjective          Jalen Pisano presents to [facility]  History of Present Illness  C/o bilat hand pain, he states he moved in April, carrying boxes, worsening symptoms with rest, he has been using hands more and symptoms improved in the past week. Wondering about shots. He is right handed, he has weak , he denies numbness or tingling, L>R. He states fingers popping, slow to open hands in am. He does not see hand specialist, tried finger splint. He did not try anything else OTC.    Patient of Dr. Ayala last visit 2019, new patient to me today.   C/o skin lesion right thumb, he saw dermatology given clobetasol cream. He states he noticed spot on right thumb also on wrist, states help with wrist lesion but did not help with thumb, states he noticed when he spent the day outside building deck, tried anti fungal cream. Tried carmax OTC which helped some.     Objective   Vital Signs:   /78 (BP Location: Left arm, Patient Position: Sitting)   Pulse 84   Temp 97.8 °F (36.6 °C) (Infrared)   Resp 20   Ht 172.7 cm (68\")   Wt 117 kg (258 lb)   SpO2 98%   BMI 39.23 kg/m²     Physical Exam  Vitals and nursing note reviewed.   Constitutional:       Appearance: He is well-developed.   HENT:      Head: Normocephalic.   Eyes:      Pupils: Pupils are equal, round, and reactive to light.   Cardiovascular:      Rate and Rhythm: Normal rate and regular rhythm.      Heart sounds: Normal heart sounds.   Pulmonary:      Effort: Pulmonary effort is normal.      Breath sounds: Normal breath sounds.   Musculoskeletal:      Right hand: No bony tenderness. Decreased range of motion. Normal strength. Normal capillary refill. Normal pulse.      Left hand: No bony tenderness. Decreased range of motion. Normal strength. Normal capillary refill. Normal pulse.   Skin:     General: Skin is warm and dry.      Findings: Lesion " present.          Neurological:      Mental Status: He is alert and oriented to person, place, and time.   Psychiatric:         Behavior: Behavior normal.         Judgment: Judgment normal.        Result Review :                 Assessment and Plan    Diagnoses and all orders for this visit:    1. Bilateral hand pain (Primary)  -     Ambulatory Referral to Hand Surgery    2. Skin lesion        Follow Up   Return if symptoms worsen or fail to improve, for Annual physical.  Patient was given instructions and counseling regarding his condition or for health maintenance advice. Please see specific information pulled into the AVS if appropriate.     Refer to hand will call with appt.   Hand brace OTC as needed.   May cont ibuprofen as needed.   encouraged to make f/u for overdue physical, labs.   He will make f/u with derm as he is established.   Patient agrees with plan of care and understands instructions. Call if worsening symptoms or any problems or concerns.

## 2021-07-22 NOTE — PATIENT INSTRUCTIONS
Refer to hand will call with appt.   Hand brace OTC as needed.   May cont ibuprofen as needed.   encouraged to make f/u for overdue physical, labs.   He will make f/u with derm as he is established.   Patient agrees with plan of care and understands instructions. Call if worsening symptoms or any problems or concerns.

## 2021-10-05 ENCOUNTER — TELEPHONE (OUTPATIENT)
Dept: FAMILY MEDICINE CLINIC | Facility: CLINIC | Age: 46
End: 2021-10-05

## 2021-10-05 RX ORDER — METHYLPREDNISOLONE 4 MG/1
TABLET ORAL
Qty: 21 TABLET | Refills: 0 | Status: SHIPPED | OUTPATIENT
Start: 2021-10-05

## 2021-10-05 NOTE — TELEPHONE ENCOUNTER
Caller: Jalen Pisano    Relationship: Self    Best call back number: [phone]    What medication are you requesting: PATIENT IS USING TRIANCINOLONE AND THIS MEDICATION ISN'T WORKING.     What are your current symptoms: POISON IVY SPREADING ALL OVER HIS BODY IN PATCHES.    How long have you been experiencing symptoms: 10 DAYS NOW     Have you had these symptoms before:    [x] Yes  [] No    Have you been treated for these symptoms before:   [x] Yes  [] No    If a prescription is needed, what is your preferred pharmacy and phone number: [id number] - [facility], [place] - [address] AT [facility] [phone] [facility] [phone] FX     Additional notes: PLEASE ADVISE PATIENT IF HE NEEDS TO BE SEEN

## (undated) DEVICE — NDL HYPO SFTY GLD 22G 1 1/2IN

## (undated) DEVICE — TOWEL,OR,DSP,ST,BLUE,STD,4/PK,20PK/CS: Brand: MEDLINE

## (undated) DEVICE — SYR LUERLOK 20CC

## (undated) DEVICE — SHOULDER STABILIZATION KIT,                                    DISPOSABLE 12 PER BOX

## (undated) DEVICE — SYR LUERLOK 50ML

## (undated) DEVICE — GLV SURG NEOLON 2G PF LF 7.5 STRL

## (undated) DEVICE — SUT MNCRYL 4/0 PS2 18 IN

## (undated) DEVICE — CLEAR-TRAC DISPOSABLE STOPCOCK: Brand: CLEAR-TRAC

## (undated) DEVICE — SUPER TURBOVAC 90 IFS: Brand: COBLATION

## (undated) DEVICE — SOL ISO/ALC RUB 70PCT 4OZ

## (undated) DEVICE — Device: Brand: LIGHT GUARD™ FLEXIBLE LIGHT HANDLE COVER (1 EACH/PKG)

## (undated) DEVICE — SUT PDS 0 CT1 36IN Z346H

## (undated) DEVICE — CANNULA THREADED FLEX 5.5 X 72MM: Brand: CLEAR-TRAC

## (undated) DEVICE — 1010 S-DRAPE TOWEL DRAPE 10/BX: Brand: STERI-DRAPE™

## (undated) DEVICE — T-MAX DISPOSABLE FACE MASK 8 PER BOX

## (undated) DEVICE — SUT ETHLN 3/0 PS2 18 IN 1669H

## (undated) DEVICE — SYS SKIN CLS DERMABOND PRINEO W/22CM MESH TP

## (undated) DEVICE — THIN OFFSET (9.0 X 0.38 X 25.0MM)

## (undated) DEVICE — APPL CHLORAPREP W/TINT 26ML ORNG

## (undated) DEVICE — DRSNG TELFA PAD NONADH STR 1S 3X8IN

## (undated) DEVICE — 4.5 FULL RADIUS BONECUTTER BLADES,                                    YELLOW, 8000 MAXIMUM RPM, PACKAGED 6                                    PER BOX, STERILE

## (undated) DEVICE — SUT VIC 2/0 CP2 CR8 18IN J762D

## (undated) DEVICE — ST TB GOFLO STRL

## (undated) DEVICE — 3M™ STERI-DRAPE™ U-DRAPE 1015: Brand: STERI-DRAPE™

## (undated) DEVICE — ARM SLING: Brand: DEROYAL

## (undated) DEVICE — GLV SURG SENSICARE ORTHO PF LF 7 STRL

## (undated) DEVICE — GLV SURG NEOLON 2G PF LF 8 STRL

## (undated) DEVICE — ADAPT DB SPIKE 2PCT FOR AR6400 TBG

## (undated) DEVICE — SUT VIC 0 0S6 CR3 18IN J754T

## (undated) DEVICE — DRSNG SURESITE WNDW 4X4.5

## (undated) DEVICE — GLV SURG SENSICARE W/ALOE PF LF 7.5 STRL

## (undated) DEVICE — 3M™ COBAN™ STERILE SELF-ADHERENT WRAP, 1584S, 4 IN X 5 YD (10 CM X 4,5 M), 18 ROLLS/CASE: Brand: 3M™ COBAN™

## (undated) DEVICE — PK SHLDR ARTHSCP 90

## (undated) DEVICE — GLV SURG SENSICARE MICRO PF LF 7.5 STRL